# Patient Record
Sex: MALE | Race: WHITE | Employment: FULL TIME | URBAN - METROPOLITAN AREA
[De-identification: names, ages, dates, MRNs, and addresses within clinical notes are randomized per-mention and may not be internally consistent; named-entity substitution may affect disease eponyms.]

---

## 2017-03-10 ENCOUNTER — DOCUMENTATION ONLY (OUTPATIENT)
Dept: OTHER | Facility: CLINIC | Age: 57
End: 2017-03-10

## 2017-03-10 DIAGNOSIS — Z71.89 ACP (ADVANCE CARE PLANNING): Chronic | ICD-10-CM

## 2017-05-22 ENCOUNTER — TRANSFERRED RECORDS (OUTPATIENT)
Dept: HEALTH INFORMATION MANAGEMENT | Facility: CLINIC | Age: 57
End: 2017-05-22

## 2018-01-29 ENCOUNTER — TRANSFERRED RECORDS (OUTPATIENT)
Dept: HEALTH INFORMATION MANAGEMENT | Facility: CLINIC | Age: 58
End: 2018-01-29

## 2018-01-29 LAB
CHOLEST SERPL-MCNC: 182 MG/DL (ref 114–200)
HDLC SERPL-MCNC: 45 MG/DL (ref 40–60)
LDLC SERPL CALC-MCNC: 117 MG/DL
TRIGL SERPL-MCNC: 102 MG/DL (ref 10–200)

## 2019-06-20 ENCOUNTER — TELEPHONE (OUTPATIENT)
Dept: INTERNAL MEDICINE | Facility: OTHER | Age: 59
End: 2019-06-20

## 2019-06-20 NOTE — TELEPHONE ENCOUNTER
Looking to see if his records came through form Dr Helton office pt would like to see Dr Trujillo d/t moved to this side of town no longer in [place]   Pamela M Lechevalier LPN

## 2019-06-24 NOTE — TELEPHONE ENCOUNTER
Received records today.  Tried to call patient to schedule appointment.  Number on file has been disconnected.

## 2019-07-12 NOTE — PROGRESS NOTES
Subjective     Xander Gutiérrez is a 59 year old male who presents to clinic today for the following health issues:    HPI   New Patient/Transfer of Care  Hypertension Follow-up      Do you check your blood pressure regularly outside of the clinic? No     Are you following a low salt diet? Yes    Are your blood pressures ever more than 140 on the top number (systolic) OR more   than 90 on the bottom number (diastolic), for example 140/90? Yes    Xander presents today for establishment of care.  He has a history of HTN.  Patient does not check his blood pressure at home. He does have a wrist cuff at home that he is able to use.   Patient reports past chest pain while working at [employer] after moving to [place]. He describes this as a tight pain across his chest. Patient has switched jobs to [employer] in [place] over 1 year ago. This job has less stress and has not experienced chest pain since transferring jobs. The patient has never had a stress test. Patient has never had chest pain anywhere besides working at [employer]. Patient rides bike and walks a couple times a week with his wife without pain.     Denies current chest pain, shortness of breath, currently leg swelling. HE is due for colonoscopy.  His wife voices some concern about memory loss today and we did discuss the option of neuro psych testing.      Patient has never had colonoscopy. Options were discussed.     Patient Active Problem List   Diagnosis     ACP (advance care planning)     Past Surgical History:   Procedure Laterality Date     HERNIA REPAIR  2004    umbilical hernia        Social History     Tobacco Use     Smoking status: Never Smoker     Smokeless tobacco: Never Used   Substance Use Topics     Alcohol use: Yes     Frequency: Monthly or less     Drinks per session: 1 or 2     Family History   Problem Relation Age of Onset     Unknown/Adopted Mother      Unknown/Adopted Father      Unknown/Adopted Sister          Current Outpatient  "Medications   Medication Sig Dispense Refill     amLODIPine (NORVASC) 10 MG tablet Take 10 mg by mouth       lisinopril (PRINIVIL/ZESTRIL) 10 MG tablet Take 10 mg by mouth       No Known Allergies  BP Readings from Last 3 Encounters:   07/23/19 138/90    Wt Readings from Last 3 Encounters:   07/23/19 99.1 kg (218 lb 8 oz)                    Reviewed and updated as needed this visit by Provider       Review of Systems   ROS COMP: Constitutional, HEENT, cardiovascular, pulmonary, gi and gu systems are negative, except as otherwise noted.      Objective    /84 (BP Location: Left arm, Patient Position: Chair, Cuff Size: Adult Large)   Pulse 71   Temp 97.8  F (36.6  C) (Tympanic)   Resp 16   Ht 1.626 m (5' 4\")   Wt 99.1 kg (218 lb 8 oz)   SpO2 98%   BMI 37.51 kg/m    Body mass index is 37.51 kg/m .  Physical Exam   GENERAL: healthy, alert and no distress  EYES: Eyes grossly normal to inspection, PERRL and conjunctivae and sclerae normal  HENT: ear canals and TM's normal, nose and mouth without ulcers or lesions  NECK: no adenopathy, no asymmetry, masses, or scars and thyroid normal to palpation  RESP: lungs clear to auscultation - no rales, rhonchi or wheezes  CV: regular rate and rhythm, normal S1 S2, no S3 or S4, no murmur, click or rub, no peripheral edema and peripheral pulses strong  ABDOMEN: Obese-soft, nontender, no hepatosplenomegaly, no masses and bowel sounds normal  MS: no gross musculoskeletal defects noted, no edema  SKIN: no suspicious lesions or rashes  NEURO: No focal deficits noted   PSYCH: mentation appears normal, affect normal/bright    Diagnostic Test Results:  No results found for this or any previous visit (from the past 24 hour(s)).  No results found for this or any previous visit.      EKG: Sinus bradycardia        Assessment & Plan   Problem List Items Addressed This Visit     None      Visit Diagnoses     Other chest pain    -  Primary    Relevant Orders    EKG 12-lead complete " w/read - (Clinic Performed)    Screening for prostate cancer        Relevant Orders    PSA, screen    Screening for diabetes mellitus        Relevant Orders    Hemoglobin A1c    Essential hypertension        Relevant Medications    lisinopril (PRINIVIL/ZESTRIL) 10 MG tablet    Other Relevant Orders    Comprehensive metabolic panel    CBC with platelets and differential    Screening for hyperlipidemia        Relevant Orders    Lipid Profile (Chol, Trig, HDL, LDL calc)    Screening for hypothyroidism        Relevant Orders    TSH with free T4 reflex         Cologaurd, Shingles and Neuropsych testing TBD    Follow up in 1 week for fasting labs and BP check      Nathen Leon, [facility] [place] [facility]

## 2019-07-23 ENCOUNTER — OFFICE VISIT (OUTPATIENT)
Dept: INTERNAL MEDICINE | Facility: OTHER | Age: 59
End: 2019-07-23
Attending: INTERNAL MEDICINE
Payer: COMMERCIAL

## 2019-07-23 VITALS
HEIGHT: 64 IN | TEMPERATURE: 97.8 F | DIASTOLIC BLOOD PRESSURE: 90 MMHG | OXYGEN SATURATION: 98 % | SYSTOLIC BLOOD PRESSURE: 138 MMHG | HEART RATE: 71 BPM | RESPIRATION RATE: 16 BRPM | BODY MASS INDEX: 37.3 KG/M2 | WEIGHT: 218.5 LBS

## 2019-07-23 DIAGNOSIS — Z12.5 SCREENING FOR PROSTATE CANCER: ICD-10-CM

## 2019-07-23 DIAGNOSIS — Z13.29 SCREENING FOR HYPOTHYROIDISM: ICD-10-CM

## 2019-07-23 DIAGNOSIS — I10 ESSENTIAL HYPERTENSION: ICD-10-CM

## 2019-07-23 DIAGNOSIS — Z13.1 SCREENING FOR DIABETES MELLITUS: ICD-10-CM

## 2019-07-23 DIAGNOSIS — R07.89 OTHER CHEST PAIN: Primary | ICD-10-CM

## 2019-07-23 DIAGNOSIS — Z13.220 SCREENING FOR HYPERLIPIDEMIA: ICD-10-CM

## 2019-07-23 PROCEDURE — G0463 HOSPITAL OUTPT CLINIC VISIT: HCPCS

## 2019-07-23 PROCEDURE — 93005 ELECTROCARDIOGRAM TRACING: CPT

## 2019-07-23 PROCEDURE — 99204 OFFICE O/P NEW MOD 45 MIN: CPT | Mod: 25 | Performed by: INTERNAL MEDICINE

## 2019-07-23 PROCEDURE — 93010 ELECTROCARDIOGRAM REPORT: CPT | Performed by: INTERNAL MEDICINE

## 2019-07-23 RX ORDER — LISINOPRIL 10 MG/1
10 TABLET ORAL
COMMUNITY
Start: 2019-06-09 | End: 2019-09-06

## 2019-07-23 RX ORDER — AMLODIPINE BESYLATE 10 MG/1
10 TABLET ORAL
COMMUNITY
Start: 2019-06-09 | End: 2019-09-06

## 2019-07-23 SDOH — HEALTH STABILITY: MENTAL HEALTH: HOW OFTEN DO YOU HAVE A DRINK CONTAINING ALCOHOL?: MONTHLY OR LESS

## 2019-07-23 SDOH — HEALTH STABILITY: MENTAL HEALTH: HOW MANY STANDARD DRINKS CONTAINING ALCOHOL DO YOU HAVE ON A TYPICAL DAY?: 1 OR 2

## 2019-07-23 ASSESSMENT — ANXIETY QUESTIONNAIRES
GAD7 TOTAL SCORE: 1
1. FEELING NERVOUS, ANXIOUS, OR ON EDGE: SEVERAL DAYS
3. WORRYING TOO MUCH ABOUT DIFFERENT THINGS: NOT AT ALL
6. BECOMING EASILY ANNOYED OR IRRITABLE: NOT AT ALL
4. TROUBLE RELAXING: NOT AT ALL
IF YOU CHECKED OFF ANY PROBLEMS ON THIS QUESTIONNAIRE, HOW DIFFICULT HAVE THESE PROBLEMS MADE IT FOR YOU TO DO YOUR WORK, TAKE CARE OF THINGS AT HOME, OR GET ALONG WITH OTHER PEOPLE: NOT DIFFICULT AT ALL
5. BEING SO RESTLESS THAT IT IS HARD TO SIT STILL: NOT AT ALL
2. NOT BEING ABLE TO STOP OR CONTROL WORRYING: NOT AT ALL
7. FEELING AFRAID AS IF SOMETHING AWFUL MIGHT HAPPEN: NOT AT ALL

## 2019-07-23 ASSESSMENT — PAIN SCALES - GENERAL: PAINLEVEL: NO PAIN (0)

## 2019-07-23 ASSESSMENT — MIFFLIN-ST. JEOR: SCORE: 1722.11

## 2019-07-23 ASSESSMENT — PATIENT HEALTH QUESTIONNAIRE - PHQ9: SUM OF ALL RESPONSES TO PHQ QUESTIONS 1-9: 5

## 2019-07-23 NOTE — NURSING NOTE
"Chief Complaint   Patient presents with     Establish Care       Initial /84 (BP Location: Left arm, Patient Position: Chair, Cuff Size: Adult Large)   Pulse 71   Temp 97.8  F (36.6  C) (Tympanic)   Resp 16   Ht 1.626 m (5' 4\")   Wt 99.1 kg (218 lb 8 oz)   SpO2 98%   BMI 37.51 kg/m   Estimated body mass index is 37.51 kg/m  as calculated from the following:    Height as of this encounter: 1.626 m (5' 4\").    Weight as of this encounter: 99.1 kg (218 lb 8 oz).  Medication Reconciliation: complete   Pamela M Lechevalier LPN      "

## 2019-07-23 NOTE — PATIENT INSTRUCTIONS
Cologaurd, Shingles and Neuropsych testing TBD    Follow up in 1 week for fasting labs and BP check

## 2019-07-24 ASSESSMENT — ANXIETY QUESTIONNAIRES: GAD7 TOTAL SCORE: 1

## 2019-07-31 DIAGNOSIS — Z13.220 SCREENING FOR HYPERLIPIDEMIA: ICD-10-CM

## 2019-07-31 DIAGNOSIS — Z12.5 SCREENING FOR PROSTATE CANCER: ICD-10-CM

## 2019-07-31 DIAGNOSIS — Z13.1 SCREENING FOR DIABETES MELLITUS: ICD-10-CM

## 2019-07-31 DIAGNOSIS — I10 ESSENTIAL HYPERTENSION: ICD-10-CM

## 2019-07-31 DIAGNOSIS — Z13.29 SCREENING FOR HYPOTHYROIDISM: ICD-10-CM

## 2019-07-31 LAB
ALBUMIN SERPL-MCNC: 4 G/DL (ref 3.4–5)
ALP SERPL-CCNC: 73 U/L (ref 40–150)
ALT SERPL W P-5'-P-CCNC: 37 U/L (ref 0–70)
ANION GAP SERPL CALCULATED.3IONS-SCNC: 7 MMOL/L (ref 3–14)
AST SERPL W P-5'-P-CCNC: 25 U/L (ref 0–45)
BASOPHILS # BLD AUTO: 0 10E9/L (ref 0–0.2)
BASOPHILS NFR BLD AUTO: 0.5 %
BILIRUB SERPL-MCNC: 0.6 MG/DL (ref 0.2–1.3)
BUN SERPL-MCNC: 22 MG/DL (ref 7–30)
CALCIUM SERPL-MCNC: 8.9 MG/DL (ref 8.5–10.1)
CHLORIDE SERPL-SCNC: 107 MMOL/L (ref 94–109)
CHOLEST SERPL-MCNC: 178 MG/DL
CO2 SERPL-SCNC: 26 MMOL/L (ref 20–32)
CREAT SERPL-MCNC: 1.01 MG/DL (ref 0.66–1.25)
DIFFERENTIAL METHOD BLD: NORMAL
EOSINOPHIL # BLD AUTO: 0.4 10E9/L (ref 0–0.7)
EOSINOPHIL NFR BLD AUTO: 5.9 %
ERYTHROCYTE [DISTWIDTH] IN BLOOD BY AUTOMATED COUNT: 13 % (ref 10–15)
EST. AVERAGE GLUCOSE BLD GHB EST-MCNC: 105 MG/DL
GFR SERPL CREATININE-BSD FRML MDRD: 82 ML/MIN/{1.73_M2}
GLUCOSE SERPL-MCNC: 97 MG/DL (ref 70–99)
HBA1C MFR BLD: 5.3 % (ref 0–5.6)
HCT VFR BLD AUTO: 46.9 % (ref 40–53)
HDLC SERPL-MCNC: 42 MG/DL
HGB BLD-MCNC: 16.6 G/DL (ref 13.3–17.7)
LDLC SERPL CALC-MCNC: 117 MG/DL
LYMPHOCYTES # BLD AUTO: 2.2 10E9/L (ref 0.8–5.3)
LYMPHOCYTES NFR BLD AUTO: 35.8 %
MCH RBC QN AUTO: 30.7 PG (ref 26.5–33)
MCHC RBC AUTO-ENTMCNC: 35.4 G/DL (ref 31.5–36.5)
MCV RBC AUTO: 87 FL (ref 78–100)
MONOCYTES # BLD AUTO: 0.6 10E9/L (ref 0–1.3)
MONOCYTES NFR BLD AUTO: 10.1 %
NEUTROPHILS # BLD AUTO: 2.9 10E9/L (ref 1.6–8.3)
NEUTROPHILS NFR BLD AUTO: 47.7 %
NONHDLC SERPL-MCNC: 136 MG/DL
PLATELET # BLD AUTO: 162 10E9/L (ref 150–450)
POTASSIUM SERPL-SCNC: 4.1 MMOL/L (ref 3.4–5.3)
PROT SERPL-MCNC: 7.2 G/DL (ref 6.8–8.8)
PSA SERPL-ACNC: 1.44 UG/L (ref 0–4)
RBC # BLD AUTO: 5.4 10E12/L (ref 4.4–5.9)
SODIUM SERPL-SCNC: 140 MMOL/L (ref 133–144)
TRIGL SERPL-MCNC: 93 MG/DL
TSH SERPL DL<=0.005 MIU/L-ACNC: 1.93 MU/L (ref 0.4–4)
WBC # BLD AUTO: 6.2 10E9/L (ref 4–11)

## 2019-07-31 PROCEDURE — 36415 COLL VENOUS BLD VENIPUNCTURE: CPT | Mod: ZL | Performed by: INTERNAL MEDICINE

## 2019-07-31 PROCEDURE — 80061 LIPID PANEL: CPT | Mod: ZL | Performed by: INTERNAL MEDICINE

## 2019-07-31 PROCEDURE — 85025 COMPLETE CBC W/AUTO DIFF WBC: CPT | Mod: ZL | Performed by: INTERNAL MEDICINE

## 2019-07-31 PROCEDURE — 40000788 ZZHCL STATISTIC ESTIMATED AVERAGE GLUCOSE: Mod: ZL | Performed by: INTERNAL MEDICINE

## 2019-07-31 PROCEDURE — 80053 COMPREHEN METABOLIC PANEL: CPT | Mod: ZL | Performed by: INTERNAL MEDICINE

## 2019-07-31 PROCEDURE — 84443 ASSAY THYROID STIM HORMONE: CPT | Mod: ZL | Performed by: INTERNAL MEDICINE

## 2019-07-31 PROCEDURE — G0103 PSA SCREENING: HCPCS | Mod: ZL | Performed by: INTERNAL MEDICINE

## 2019-07-31 PROCEDURE — 83036 HEMOGLOBIN GLYCOSYLATED A1C: CPT | Mod: ZL | Performed by: INTERNAL MEDICINE

## 2019-09-06 DIAGNOSIS — I10 HYPERTENSION, UNSPECIFIED TYPE: Primary | ICD-10-CM

## 2019-09-06 RX ORDER — LISINOPRIL 10 MG/1
10 TABLET ORAL DAILY
Qty: 90 TABLET | Refills: 0 | Status: SHIPPED | OUTPATIENT
Start: 2019-09-06 | End: 2019-09-13

## 2019-09-06 RX ORDER — AMLODIPINE BESYLATE 10 MG/1
10 TABLET ORAL DAILY
Qty: 90 TABLET | Refills: 0 | Status: SHIPPED | OUTPATIENT
Start: 2019-09-06 | End: 2019-09-13

## 2019-09-06 NOTE — TELEPHONE ENCOUNTER
Lisinopril  Last office visit: 07/23/19  Last refill: historical.  Medication never filled by provider.  Please review and sign if appropriate.      Amlodipine  Last office visit: 07/23/*19  Last refill: historical.  Medication never filled by provider  Please review and sign if appropriate.     Thank you.    PCP Dr. Soliz

## 2019-09-13 DIAGNOSIS — I10 HYPERTENSION, UNSPECIFIED TYPE: ICD-10-CM

## 2019-09-13 RX ORDER — AMLODIPINE BESYLATE 10 MG/1
10 TABLET ORAL DAILY
Qty: 90 TABLET | Refills: 0 | Status: SHIPPED | OUTPATIENT
Start: 2019-09-13 | End: 2020-09-22

## 2019-09-13 RX ORDER — LISINOPRIL 10 MG/1
10 TABLET ORAL DAILY
Qty: 90 TABLET | Refills: 0 | Status: SHIPPED | OUTPATIENT
Start: 2019-09-13 | End: 2020-04-30

## 2020-04-30 DIAGNOSIS — I10 HYPERTENSION, UNSPECIFIED TYPE: ICD-10-CM

## 2020-04-30 RX ORDER — LISINOPRIL 10 MG/1
TABLET ORAL
Qty: 90 TABLET | Refills: 0 | Status: SHIPPED | OUTPATIENT
Start: 2020-04-30 | End: 2021-01-22

## 2020-04-30 NOTE — TELEPHONE ENCOUNTER
lisinopril (ZESTRIL) 10 MG tablet     ACE Inhibitors (Including Combos) Protocol Failed    Blood pressure under 140/90 in past 12 months    BP Readings from Last 3 Encounters:   07/23/19 138/90

## 2020-04-30 NOTE — TELEPHONE ENCOUNTER
Lisinopril      Last Written Prescription Date:  09/13/2019  Last Fill Quantity: 90,   # refills: 0  Last Office Visit: 07/23/2019

## 2020-07-01 ENCOUNTER — OFFICE VISIT (OUTPATIENT)
Dept: CHIROPRACTIC MEDICINE | Facility: OTHER | Age: 60
End: 2020-07-01
Attending: CHIROPRACTOR
Payer: COMMERCIAL

## 2020-07-01 DIAGNOSIS — M54.50 ACUTE BILATERAL LOW BACK PAIN WITHOUT SCIATICA: ICD-10-CM

## 2020-07-01 DIAGNOSIS — M99.02 SEGMENTAL AND SOMATIC DYSFUNCTION OF THORACIC REGION: ICD-10-CM

## 2020-07-01 DIAGNOSIS — M99.01 SEGMENTAL AND SOMATIC DYSFUNCTION OF CERVICAL REGION: Primary | ICD-10-CM

## 2020-07-01 DIAGNOSIS — M99.03 SEGMENTAL AND SOMATIC DYSFUNCTION OF LUMBAR REGION: ICD-10-CM

## 2020-07-01 PROCEDURE — 98941 CHIROPRACT MANJ 3-4 REGIONS: CPT | Mod: AT | Performed by: CHIROPRACTOR

## 2020-07-02 NOTE — PROGRESS NOTES
Subjective Finding:    Chief compalint: Patient presents with:  Back Pain  Neck Pain  , Pain Scale: 4/10, Intensity: dull and ache, Duration: 1 week, Radiating: no.    Date of injury:       Activities that the pain restricts:   Home/household/hobbies/social activities: yes.  Work duties: no.  Sleep: yes.  Makes symptoms better: rest.  Makes symptoms worse: activity, lumbar extension, lumbar flexion, cervical extension and cervical flexion.  Have you seen anyone else for the symptoms? No.  Work related: no.  Automobile related injury: no    Objective and Assessment:    Posture Analysis:   High shoulder: right.  Head tilt: right.  High iliac crest: right.  Head carriage: forward.  Thoracic Kyphosis: neutral.  Lumbar Lordosis: neutral.    Lumbar Range of Motion: extension decreased.  Cervical Range of Motion: extension decreased, left lateral flexion decreased and right lateral flexion decreased.  Thoracic Range of Motion: extension decreased.  Extremity Range of Motion: .    Palpation:   Quad lumb: bilateral, referred pain: no  T paraspinals: stiff, no  Traps: stiff, referred pain: no    Segmental dysfunction pre-treatment and treatment area: C6  T5-6-7  L3-4.    Assessment post-treatment:  Cervical: ROM increased and pain and tenderness decreased.  Thoracic: ROM increased.  Lumbar: ROM increased and muscle spasm decreased.    Comments:      Complicating Factors: .    Plan / Procedure:    Treatment plan:  PRN  Instructed patient: ice 20 minutes every other hour as needed, stretch as instructed at visit and walk 10 minutes.  Short term goals: reduce pain and increase joint flexibility.  Long term goals: restore normal function.  Prognosis: excellent.

## 2020-09-15 ENCOUNTER — OFFICE VISIT (OUTPATIENT)
Dept: FAMILY MEDICINE | Facility: OTHER | Age: 60
End: 2020-09-15
Attending: NURSE PRACTITIONER
Payer: COMMERCIAL

## 2020-09-15 ENCOUNTER — ANCILLARY PROCEDURE (OUTPATIENT)
Dept: GENERAL RADIOLOGY | Facility: OTHER | Age: 60
End: 2020-09-15
Attending: NURSE PRACTITIONER
Payer: COMMERCIAL

## 2020-09-15 VITALS
SYSTOLIC BLOOD PRESSURE: 162 MMHG | DIASTOLIC BLOOD PRESSURE: 88 MMHG | HEIGHT: 64 IN | BODY MASS INDEX: 37.05 KG/M2 | WEIGHT: 217 LBS | OXYGEN SATURATION: 98 % | TEMPERATURE: 97.2 F | HEART RATE: 66 BPM

## 2020-09-15 DIAGNOSIS — M79.672 LEFT FOOT PAIN: Primary | ICD-10-CM

## 2020-09-15 DIAGNOSIS — M77.32 CALCANEAL SPUR, LEFT: ICD-10-CM

## 2020-09-15 DIAGNOSIS — M79.672 LEFT FOOT PAIN: ICD-10-CM

## 2020-09-15 PROBLEM — I10 ESSENTIAL HYPERTENSION: Status: ACTIVE | Noted: 2020-09-15

## 2020-09-15 PROCEDURE — G0463 HOSPITAL OUTPT CLINIC VISIT: HCPCS

## 2020-09-15 PROCEDURE — 73630 X-RAY EXAM OF FOOT: CPT | Mod: TC,LT,FY

## 2020-09-15 PROCEDURE — 99202 OFFICE O/P NEW SF 15 MIN: CPT | Performed by: NURSE PRACTITIONER

## 2020-09-15 ASSESSMENT — MIFFLIN-ST. JEOR: SCORE: 1710.31

## 2020-09-15 ASSESSMENT — PAIN SCALES - GENERAL: PAINLEVEL: SEVERE PAIN (6)

## 2020-09-15 NOTE — PATIENT INSTRUCTIONS
"    Assessment & Plan     Left foot pain  - XR FOOT LT G/E 3 VW (Clinic Performed); Future  - ORTHOPEDIC ADULT REFERRAL; Future    Calcaneal spur, left  - ORTHOPEDIC ADULT REFERRAL; Future    Ibuprofen PRN  Elevate  Crutches   Ice  ER as needed         BMI:   Estimated body mass index is 37.25 kg/m  as calculated from the following:    Height as of this encounter: 1.626 m (5' 4\").    Weight as of this encounter: 98.4 kg (217 lb).       CDM visit scheduled with Dr Carolyn Lovell, [facility]    "

## 2020-09-15 NOTE — LETTER
[facility]  [address]  [place] [address]  Phone: [phone]    September 15, 2020        Xander Gutiérrez  [address] [place] [id number]          To whom it may concern:    RE: Xander Gutiérrez    Please excuse from work 9/15 and 9/16/2020.    Please contact me for questions or concerns.      Sincerely,        Tawanna Lovell, CNP

## 2020-09-15 NOTE — NURSING NOTE
"Chief Complaint   Patient presents with     Foot Injury       Initial BP (!) 162/88 (BP Location: Right arm, Patient Position: Chair, Cuff Size: Adult Regular)   Pulse 66   Temp 97.2  F (36.2  C) (Tympanic)   Ht 1.626 m (5' 4\")   Wt 98.4 kg (217 lb)   SpO2 98%   BMI 37.25 kg/m   Estimated body mass index is 37.25 kg/m  as calculated from the following:    Height as of this encounter: 1.626 m (5' 4\").    Weight as of this encounter: 98.4 kg (217 lb).  Medication Reconciliation: complete  Caridad Hamlin LPN    "

## 2020-09-15 NOTE — PROGRESS NOTES
Xander Gutiérrez is a 60 year old male who presents to clinic today for the following health issues:      Musculoskeletal problem/pain  Onset/Duration: yesterday  Description  Location: foot - left  Joint Swelling: no  Redness: YES  Pain: YES  Warmth: no  Intensity:  moderate  Progression of Symptoms:  same  Accompanying signs and symptoms:   Fevers: no  Numbness/tingling/weakness: no  History  Trauma to the area: YES  Recent illness:  no  Previous similar problem: no  Previous evaluation:  no  Precipitating or alleviating factors:  Aggravating factors include: sitting, walking, climbing stairs and overuse  Therapies tried and outcome: rest/inactivity and Ibuprofen        Patient Active Problem List   Diagnosis     ACP (advance care planning)     Essential hypertension     Past Surgical History:   Procedure Laterality Date     HERNIA REPAIR  2004    umbilical hernia        Social History     Tobacco Use     Smoking status: Never Smoker     Smokeless tobacco: Never Used   Substance Use Topics     Alcohol use: Yes     Frequency: Monthly or less     Drinks per session: 1 or 2     Family History   Problem Relation Age of Onset     Unknown/Adopted Mother      Unknown/Adopted Father      Unknown/Adopted Sister            Current Outpatient Medications   Medication Sig Dispense Refill     lisinopril (ZESTRIL) 10 MG tablet TAKE 1 TABLET(10 MG) BY MOUTH DAILY 90 tablet 0     amLODIPine (NORVASC) 10 MG tablet Take 1 tablet (10 mg) by mouth daily (Patient not taking: Reported on 9/15/2020) 90 tablet 0     No Known Allergies       Recent Labs   Lab Test 07/31/19  0727 01/29/18   A1C 5.3  --    * 117   HDL 42 45   TRIG 93 102   ALT 37  --    CR 1.01  --    GFRESTIMATED 82  --    GFRESTBLACK >90  --    POTASSIUM 4.1  --    TSH 1.93  --         BP Readings from Last 3 Encounters:   09/15/20 (!) 162/88   07/23/19 138/90    Wt Readings from Last 3 Encounters:   09/15/20 98.4 kg (217 lb)   07/23/19 99.1 kg (218 lb 8 oz)     "          Review of Systems   Constitutional, HEENT, cardiovascular, pulmonary, gi and gu systems are negative, except as otherwise noted.        Objective    BP (!) 162/88 (BP Location: Right arm, Patient Position: Chair, Cuff Size: Adult Regular)   Pulse 66   Temp 97.2  F (36.2  C) (Tympanic)   Ht 1.626 m (5' 4\")   Wt 98.4 kg (217 lb)   SpO2 98%   BMI 37.25 kg/m    Body mass index is 37.25 kg/m .       Xray completed, it appears he has a cracked anterior calcaneal bone spur.    Physical Exam   GENERAL: healthy, alert and no distress  NECK: no adenopathy, no asymmetry, masses, or scars and thyroid normal to palpation  RESP: lungs clear to auscultation - no rales, rhonchi or wheezes  CV: regular rate and rhythm, normal S1 S2, no S3 or S4, no murmur, click or rub, no peripheral edema and peripheral pulses strong  MS: Left foot pain - anterior calcaneous  SKIN: no suspicious lesions or rashes  PSYCH: mentation appears normal, affect normal/bright            Assessment & Plan     Left foot pain  - XR FOOT LT G/E 3 VW (Clinic Performed); Future  - ORTHOPEDIC ADULT REFERRAL; Future    Calcaneal spur, left  - ORTHOPEDIC ADULT REFERRAL; Future    Ibuprofen PRN  Elevate  Crutches   Ice  ER as needed         BMI:   Estimated body mass index is 37.25 kg/m  as calculated from the following:    Height as of this encounter: 1.626 m (5' 4\").    Weight as of this encounter: 98.4 kg (217 lb).       CDM visit scheduled with Dr Carolyn Lovell, [facility]    "

## 2020-09-16 ENCOUNTER — OFFICE VISIT (OUTPATIENT)
Dept: PODIATRY | Facility: OTHER | Age: 60
End: 2020-09-16
Attending: PODIATRIST
Payer: COMMERCIAL

## 2020-09-16 VITALS
WEIGHT: 218 LBS | TEMPERATURE: 97.4 F | HEIGHT: 64 IN | BODY MASS INDEX: 37.22 KG/M2 | SYSTOLIC BLOOD PRESSURE: 150 MMHG | HEART RATE: 75 BPM | OXYGEN SATURATION: 98 % | DIASTOLIC BLOOD PRESSURE: 90 MMHG | RESPIRATION RATE: 15 BRPM

## 2020-09-16 DIAGNOSIS — M79.672 LEFT FOOT PAIN: ICD-10-CM

## 2020-09-16 DIAGNOSIS — S93.692A RUPTURE OF PLANTAR FASCIA OF LEFT FOOT, INITIAL ENCOUNTER: Primary | ICD-10-CM

## 2020-09-16 DIAGNOSIS — E66.01 MORBID OBESITY (H): ICD-10-CM

## 2020-09-16 DIAGNOSIS — M77.32 CALCANEAL SPUR, LEFT: ICD-10-CM

## 2020-09-16 PROCEDURE — 99203 OFFICE O/P NEW LOW 30 MIN: CPT | Performed by: PODIATRIST

## 2020-09-16 PROCEDURE — G0463 HOSPITAL OUTPT CLINIC VISIT: HCPCS

## 2020-09-16 ASSESSMENT — PAIN SCALES - GENERAL: PAINLEVEL: MODERATE PAIN (5)

## 2020-09-16 ASSESSMENT — MIFFLIN-ST. JEOR: SCORE: 1714.84

## 2020-09-16 NOTE — LETTER
September 17, 2020      Xander Gutiérrez  [address] [place] [id number]        To Whom It May Concern:    Xander Gutiérrez was seen in our clinic on 09/16/2020, for a LEFT foot injury.     Restrictions for patient:  Patient may drive or do any sitting activities.  Patient must wear a CAM walker boot at all times. He is instructed to also use crutches if he has pain in the CAM boot.  He may stop putting weight on the foot (with the boot on) if he is having any pain. He may do standing activities with the boot on his foot as long as it is not causing any pain.    He may return to work with the above restrictions. He will follow-up with podiatry on 11/04/2020 to determine if his restrictions can be lifted.      Sincerely,        Samia Rodas DPM

## 2020-09-16 NOTE — PROGRESS NOTES
"Chief complaint: Patient presents with:  Consult: referred by Tawanna Lovell for left foot spur        History of Present Illness: This 60 year old male is seen at the request of Liliya for evaluation and suggestions of management of LEFT heel pain. He was letting his dog outside on Monday, 09/14/2020, and he missed a stair and his heel hit the landing very hard. He instantly had moderate pain to his foot. He took some IBUprofen which decreased his pain. He saw Tawanna Lovell on 09/15/2020 who ordered an x-ray.     He has been using crutches because of the pain.     He works at [email or web address]. He delivers parts and shuttles people. 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    No further pedal complaints today.     Patient does not use tobacco products.         BP (!) 150/90 (BP Location: Left arm, Cuff Size: Adult Regular)   Pulse 75   Temp 97.4  F (36.3  C) (Tympanic)   Resp 15   Ht 1.626 m (5' 4\")   Wt 98.9 kg (218 lb)   SpO2 98%   BMI 37.42 kg/m      Patient Active Problem List   Diagnosis     ACP (advance care planning)     Essential hypertension       Past Surgical History:   Procedure Laterality Date     HERNIA REPAIR  2004    umbilical hernia        Current Outpatient Medications   Medication     amLODIPine (NORVASC) 10 MG tablet     lisinopril (ZESTRIL) 10 MG tablet     No current facility-administered medications for this visit.         No Known Allergies    Family History   Problem Relation Age of Onset     Unknown/Adopted Mother      Unknown/Adopted Father      Unknown/Adopted Sister        Social History     Socioeconomic History     Marital status:      Spouse name: None     Number of children: None     Years of education: None     Highest education level: None   Occupational History     None   Social Needs     Financial resource strain: None     Food insecurity     Worry: None     Inability: None     Transportation needs     Medical: None     Non-medical: None   Tobacco Use     Smoking status: Never Smoker     " Smokeless tobacco: Never Used   Substance and Sexual Activity     Alcohol use: Yes     Frequency: Monthly or less     Drinks per session: 1 or 2     Drug use: Not Currently     Sexual activity: Yes     Partners: Female   Lifestyle     Physical activity     Days per week: None     Minutes per session: None     Stress: None   Relationships     Social connections     Talks on phone: None     Gets together: None     Attends [employer] service: None     Active member of club or organization: None     Attends meetings of clubs or organizations: None     Relationship status: None     Intimate partner violence     Fear of current or ex partner: None     Emotionally abused: None     Physically abused: None     Forced sexual activity: None   Other Topics Concern     None   Social History Narrative     None       ROS: 10 point ROS neg other than the symptoms noted above in the HPI.  EXAM  Constitutional: healthy, alert and no distress    Psychiatric: mentation appears normal and affect normal/bright    VASCULAR:  -Dorsalis pedis pulse +2/4 b/l  -Posterior tibial pulse +2/4 b/l  -Capillary refill time < 3 seconds to b/l hallux  -Hair growth Present to b/l anterior legs and ankles  NEURO:  -Light touch sensation intact to b/l plantar forefoot  DERM:  -Skin temperature, texture and turgor WNL b/l  -Toenails elongated, thickened, dystrophic and discolored x 10  MSK:  -Moderate pain on palpation to LEFT medial tubercle and LEFT plantar central heel  -Mild tenderness to plantar central heel  -No pain to lateral calcaneus  -Muscle strength of ankles +5/5 for dorsiflexion, plantarflexion, ABDUction and ADDuction b/l    -Ankle joint passive ROM within normal limits except for dorsiflexion:    Dorsiflexion, RIGHT Straight knee 0 degrees    Dorsiflexion, LEFT Straight knee 0 degrees    LEFT HEEL RADIOGRAPH 09/16/2020  FINDINGS:  There are bony spurs at the insertion of the Achilles tendon and plantar fascia to the calcaneus. Mild  degenerative changes are seen at the navicular cuneiform articulation. No acute fractures or destructive lesions are noted.                                 IMPRESSION: Calcaneal spurs    MARLENY WHITE MD  ============================================================    ASSESSMENT:  (O48.339O) Rupture of plantar fascia of left foot, initial encounter  (primary encounter diagnosis)    (M79.672) Left foot pain    (M77.32) Calcaneal spur, left    (E66.01) Morbid obesity (H)      PLAN:  -Patient evaluated and examined. Treatment options discussed with no educational barriers noted.  -Reviewed radiographs with patient -- he has a small avulsion fracture of the plantar calcaneal spur. He likely had a partial rupture of some of the soft tissues in  This area, particularly the plantar fascia since he has the most pain at the plantar fascia insertion site.  -Patient's plantar fascia appears intact but the heel spur fragment and the location of pain after his description of trauma suggests a likely partial rupture of the plantar fascia. Discussed the etiology of how this likely occurred with his trauma. He may heal well with conservative treatment options, so these will be exhausted first.    -Compression socks: Advised to wear compression socks all day (especially when working) to decrease LOWER EXTREMITY swelling in both feet and legs. Apply the socks first thing in the morning before getting out of bed and remove them at night when the feet are elevated in bed.  -Stability Shoe Gear: This involves wearing a solid tennis shoe that bends at the toe, but has a solid midfoot shank and heel contour.   -Icing: Ice the bottom of the foot minimally once a day for ten minutes per foot with a frozen water bottle. Ice after any extended amount of time on your feet.    -Dispensed a long leg CAM boot to patient (size medium) in clinic.  ---Patient to wear this with crutches. As tolerated, he may slowly transition to one crutch then no  crutches. After a couple weeks, if tolerated, he may slowly transition to a tennis shoe. If this is too uncomfortable, however, he may require the boot for up to six weeks.  ----Patient advised to purchase a soft gel heel cup to go with the boot to decrease pressure on the heel / plantar fascia insertion site.    -Patient in agreement with the above treatment plan and all of patient's questions were answered.      RTC six weeks to evaluate LEFT plantar foot pain post injury        Samia Rodas DPM

## 2020-09-16 NOTE — NURSING NOTE
"Chief Complaint   Patient presents with     Consult     referred by Tawanna Lovell for left foot spur       Initial BP (!) 150/90 (BP Location: Left arm, Cuff Size: Adult Regular)   Pulse 75   Temp 97.4  F (36.3  C) (Tympanic)   Resp 15   Ht 1.626 m (5' 4\")   Wt 98.9 kg (218 lb)   SpO2 98%   BMI 37.42 kg/m   Estimated body mass index is 37.42 kg/m  as calculated from the following:    Height as of this encounter: 1.626 m (5' 4\").    Weight as of this encounter: 98.9 kg (218 lb).  Medication Reconciliation: complete  Janey Gold LPN  "

## 2020-09-16 NOTE — PATIENT INSTRUCTIONS
-Compression socks: Advised to wear compression socks all day (especially when working) to decrease LOWER EXTREMITY swelling in both feet and legs. Apply the socks first thing in the morning before getting out of bed and remove them at night when the feet are elevated in bed.  -Stability Shoe Gear: This involves wearing a solid tennis shoe that bends at the toe, but has a solid midfoot shank and heel contour.   -Icing: Ice the bottom of the foot minimally once a day for ten minutes per foot with a frozen water bottle. Ice after any extended amount of time on your feet.      -Add a gel heel cup to the LEFT boot. Use crutches until no longer tolerated

## 2020-09-21 NOTE — PROGRESS NOTES
Subjective     Xander Gutiérrez is a 60 year old male who presents to clinic today for the following health issues:    HPI       Hypertension Follow-up      Do you check your blood pressure regularly outside of the clinic? No     Are you following a low salt diet? Yes    Are your blood pressures ever more than 140 on the top number (systolic) OR more   than 90 on the bottom number (diastolic), for example 140/90? Yes      Xander Gutiérrez presents today for follow up of his HTN.  He denies any chest pain or SOB.  He was previously on Amlodipine with Lisinopril but now just on Lisinopril.  He would prefer to avoid doing labs today.  He returns to clinic on November 4th for his left ankle follow up so he will get a repeat BP check then and do labs.          Review of Systems   Constitutional, HEENT, cardiovascular, pulmonary, gi and gu systems are negative, except as otherwise noted.      Objective    BP (!) 148/82 (BP Location: Left arm, Patient Position: Chair, Cuff Size: Adult Large)   Pulse 71   Temp 96.6  F (35.9  C) (Tympanic)   Wt 98.9 kg (218 lb)   SpO2 99%   BMI 37.42 kg/m    Body mass index is 37.42 kg/m .  Physical Exam       No results found for this or any previous visit (from the past 24 hour(s)).  No results found for any visits on 09/22/20.  Orders Only on 07/31/2019   Component Date Value Ref Range Status     Sodium 07/31/2019 140  133 - 144 mmol/L Final     Potassium 07/31/2019 4.1  3.4 - 5.3 mmol/L Final     Chloride 07/31/2019 107  94 - 109 mmol/L Final     Carbon Dioxide 07/31/2019 26  20 - 32 mmol/L Final     Anion Gap 07/31/2019 7  3 - 14 mmol/L Final     Glucose 07/31/2019 97  70 - 99 mg/dL Final    Fasting specimen     Urea Nitrogen 07/31/2019 22  7 - 30 mg/dL Final     Creatinine 07/31/2019 1.01  0.66 - 1.25 mg/dL Final     GFR Estimate 07/31/2019 82  >60 mL/min/[1.73_m2] Final    Comment: Non  GFR Calc  Starting 12/18/2018, serum creatinine based estimated GFR (eGFR) will be    calculated using the Chronic Kidney Disease Epidemiology Collaboration   (CKD-EPI) equation.       GFR Estimate If Black 07/31/2019 >90  >60 mL/min/[1.73_m2] Final    Comment:  GFR Calc  Starting 12/18/2018, serum creatinine based estimated GFR (eGFR) will be   calculated using the Chronic Kidney Disease Epidemiology Collaboration   (CKD-EPI) equation.       Calcium 07/31/2019 8.9  8.5 - 10.1 mg/dL Final     Bilirubin Total 07/31/2019 0.6  0.2 - 1.3 mg/dL Final     Albumin 07/31/2019 4.0  3.4 - 5.0 g/dL Final     Protein Total 07/31/2019 7.2  6.8 - 8.8 g/dL Final     Alkaline Phosphatase 07/31/2019 73  40 - 150 U/L Final     ALT 07/31/2019 37  0 - 70 U/L Final     AST 07/31/2019 25  0 - 45 U/L Final     TSH 07/31/2019 1.93  0.40 - 4.00 mU/L Final     Cholesterol 07/31/2019 178  <200 mg/dL Final     Triglycerides 07/31/2019 93  <150 mg/dL Final    Fasting specimen     HDL Cholesterol 07/31/2019 42  >39 mg/dL Final     LDL Cholesterol Calculated 07/31/2019 117* <100 mg/dL Final    Comment: Above desirable:  100-129 mg/dl  Borderline High:  130-159 mg/dL  High:             160-189 mg/dL  Very high:       >189 mg/dl       Non HDL Cholesterol 07/31/2019 136* <130 mg/dL Final    Comment: Above Desirable:  130-159 mg/dl  Borderline high:  160-189 mg/dl  High:             190-219 mg/dl  Very high:       >219 mg/dl       PSA 07/31/2019 1.44  0 - 4 ug/L Final    Assay Method:  Chemiluminescence using Siemens Vista analyzer     Hemoglobin A1C 07/31/2019 5.3  0 - 5.6 % Final    Comment: Normal <5.7% Prediabetes 5.7-6.4%  Diabetes 6.5% or higher - adopted from ADA   consensus guidelines.       WBC 07/31/2019 6.2  4.0 - 11.0 10e9/L Final     RBC Count 07/31/2019 5.40  4.4 - 5.9 10e12/L Final     Hemoglobin 07/31/2019 16.6  13.3 - 17.7 g/dL Final     Hematocrit 07/31/2019 46.9  40.0 - 53.0 % Final     MCV 07/31/2019 87  78 - 100 fl Final     MCH 07/31/2019 30.7  26.5 - 33.0 pg Final     MCHC 07/31/2019 35.4  31.5  - 36.5 g/dL Final     RDW 07/31/2019 13.0  10.0 - 15.0 % Final     Platelet Count 07/31/2019 162  150 - 450 10e9/L Final     % Neutrophils 07/31/2019 47.7  % Final     % Lymphocytes 07/31/2019 35.8  % Final     % Monocytes 07/31/2019 10.1  % Final     % Eosinophils 07/31/2019 5.9  % Final     % Basophils 07/31/2019 0.5  % Final     Absolute Neutrophil 07/31/2019 2.9  1.6 - 8.3 10e9/L Final     Absolute Lymphocytes 07/31/2019 2.2  0.8 - 5.3 10e9/L Final     Absolute Monocytes 07/31/2019 0.6  0.0 - 1.3 10e9/L Final     Absolute Eosinophils 07/31/2019 0.4  0.0 - 0.7 10e9/L Final     Absolute Basophils 07/31/2019 0.0  0.0 - 0.2 10e9/L Final     Diff Method 07/31/2019 Automated Method   Final     Estimated Average Glucose 07/31/2019 105  mg/dL Final           Assessment & Plan   Problem List Items Addressed This Visit     None      Visit Diagnoses     Screening for prostate cancer    -  Primary    Relevant Orders    PSA, screen    Hypertension, unspecified type        Relevant Orders    Basic metabolic panel-future    CBC with platelets and differential-future               Nathen Leon DO  [facility]

## 2020-09-22 ENCOUNTER — OFFICE VISIT (OUTPATIENT)
Dept: INTERNAL MEDICINE | Facility: OTHER | Age: 60
End: 2020-09-22
Attending: INTERNAL MEDICINE
Payer: COMMERCIAL

## 2020-09-22 VITALS
DIASTOLIC BLOOD PRESSURE: 82 MMHG | BODY MASS INDEX: 37.42 KG/M2 | SYSTOLIC BLOOD PRESSURE: 148 MMHG | HEART RATE: 71 BPM | TEMPERATURE: 96.6 F | OXYGEN SATURATION: 99 % | WEIGHT: 218 LBS

## 2020-09-22 DIAGNOSIS — I10 HYPERTENSION, UNSPECIFIED TYPE: ICD-10-CM

## 2020-09-22 DIAGNOSIS — Z12.5 SCREENING FOR PROSTATE CANCER: Primary | ICD-10-CM

## 2020-09-22 PROCEDURE — G0463 HOSPITAL OUTPT CLINIC VISIT: HCPCS

## 2020-09-22 PROCEDURE — 99213 OFFICE O/P EST LOW 20 MIN: CPT | Performed by: INTERNAL MEDICINE

## 2020-09-22 ASSESSMENT — PAIN SCALES - GENERAL: PAINLEVEL: MILD PAIN (3)

## 2020-09-22 NOTE — NURSING NOTE
"Chief Complaint   Patient presents with     Hypertension       Initial BP (!) 148/82 (BP Location: Left arm, Patient Position: Chair, Cuff Size: Adult Large)   Pulse 71   Temp 96.6  F (35.9  C) (Tympanic)   Wt 98.9 kg (218 lb)   SpO2 99%   BMI 37.42 kg/m   Estimated body mass index is 37.42 kg/m  as calculated from the following:    Height as of 9/16/20: 1.626 m (5' 4\").    Weight as of this encounter: 98.9 kg (218 lb).  Medication Reconciliation: complete  ADRIANNE MENDIOLA LPN    "

## 2020-11-04 ENCOUNTER — OFFICE VISIT (OUTPATIENT)
Dept: PODIATRY | Facility: OTHER | Age: 60
End: 2020-11-04
Attending: PODIATRIST
Payer: COMMERCIAL

## 2020-11-04 VITALS
HEART RATE: 72 BPM | BODY MASS INDEX: 38.28 KG/M2 | TEMPERATURE: 97.7 F | WEIGHT: 223 LBS | DIASTOLIC BLOOD PRESSURE: 80 MMHG | SYSTOLIC BLOOD PRESSURE: 120 MMHG | OXYGEN SATURATION: 98 %

## 2020-11-04 DIAGNOSIS — M77.32 CALCANEAL SPUR, LEFT: ICD-10-CM

## 2020-11-04 DIAGNOSIS — E66.01 MORBID OBESITY (H): ICD-10-CM

## 2020-11-04 DIAGNOSIS — S93.692A RUPTURE OF PLANTAR FASCIA OF LEFT FOOT, INITIAL ENCOUNTER: Primary | ICD-10-CM

## 2020-11-04 LAB
BASOPHILS # BLD AUTO: 0.1 10E9/L (ref 0–0.2)
BASOPHILS NFR BLD AUTO: 0.7 %
DIFFERENTIAL METHOD BLD: NORMAL
EOSINOPHIL # BLD AUTO: 0.3 10E9/L (ref 0–0.7)
EOSINOPHIL NFR BLD AUTO: 4.3 %
ERYTHROCYTE [DISTWIDTH] IN BLOOD BY AUTOMATED COUNT: 13 % (ref 10–15)
HCT VFR BLD AUTO: 47.2 % (ref 40–53)
HGB BLD-MCNC: 16.3 G/DL (ref 13.3–17.7)
LYMPHOCYTES # BLD AUTO: 2.4 10E9/L (ref 0.8–5.3)
LYMPHOCYTES NFR BLD AUTO: 33.6 %
MCH RBC QN AUTO: 29.5 PG (ref 26.5–33)
MCHC RBC AUTO-ENTMCNC: 34.5 G/DL (ref 31.5–36.5)
MCV RBC AUTO: 85 FL (ref 78–100)
MONOCYTES # BLD AUTO: 0.6 10E9/L (ref 0–1.3)
MONOCYTES NFR BLD AUTO: 8.2 %
NEUTROPHILS # BLD AUTO: 3.8 10E9/L (ref 1.6–8.3)
NEUTROPHILS NFR BLD AUTO: 53.2 %
PLATELET # BLD AUTO: 167 10E9/L (ref 150–450)
RBC # BLD AUTO: 5.53 10E12/L (ref 4.4–5.9)
WBC # BLD AUTO: 7.2 10E9/L (ref 4–11)

## 2020-11-04 PROCEDURE — G0103 PSA SCREENING: HCPCS | Mod: ZL | Performed by: INTERNAL MEDICINE

## 2020-11-04 PROCEDURE — 85025 COMPLETE CBC W/AUTO DIFF WBC: CPT | Mod: ZL | Performed by: INTERNAL MEDICINE

## 2020-11-04 PROCEDURE — 84153 ASSAY OF PSA TOTAL: CPT | Performed by: INTERNAL MEDICINE

## 2020-11-04 PROCEDURE — 80048 BASIC METABOLIC PNL TOTAL CA: CPT | Mod: ZL | Performed by: INTERNAL MEDICINE

## 2020-11-04 PROCEDURE — 36415 COLL VENOUS BLD VENIPUNCTURE: CPT | Mod: ZL | Performed by: INTERNAL MEDICINE

## 2020-11-04 PROCEDURE — 99213 OFFICE O/P EST LOW 20 MIN: CPT | Performed by: PODIATRIST

## 2020-11-04 PROCEDURE — G0463 HOSPITAL OUTPT CLINIC VISIT: HCPCS

## 2020-11-04 ASSESSMENT — PAIN SCALES - GENERAL: PAINLEVEL: NO PAIN (0)

## 2020-11-04 NOTE — NURSING NOTE
"Chief Complaint   Patient presents with     RECHECK     Plantar Fascitis  Left Heel       Initial /80   Pulse 72   Temp 97.7  F (36.5  C) (Tympanic)   Wt 101.2 kg (223 lb)   SpO2 98%   BMI 38.28 kg/m   Estimated body mass index is 38.28 kg/m  as calculated from the following:    Height as of 9/16/20: 1.626 m (5' 4\").    Weight as of this encounter: 101.2 kg (223 lb).  Medication Reconciliation: complete  Giuliana Blancas LPN  "

## 2020-11-04 NOTE — PROGRESS NOTES
Chief complaint: Patient presents with:  RECHECK: Plantar Fascitis  Left Heel      History of Present Illness: This 60 year old male is seen for follow-up management of LEFT heel pain.     He was wearing a CAM boot full time, but the past few weeks, he has been wearing his CAM boot while using crutches when walking outside and he has been using a house slipper at home. He has minimal pain when walking at home without the boot.    He works at [employer]. 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    Patient says he was called to schedule an appointment for orthotics, but he did not schedule it because he says he already has orthotics and they are working well in his shoes.    No further pedal complaints today.     Patient does not use tobacco products.       History of LEFT heel injury:  Patient was letting his dog outside on Monday, 09/14/2020, and he missed a stair and his heel hit the landing very hard. He instantly had moderate pain to his foot. He took some IBUprofen which decreased his pain. He saw Tawanna Lovell on 09/15/2020 who ordered an x-ray which showed a small bone spur fracture on the plantar surface of the heel bone.        /80   Pulse 72   Temp 97.7  F (36.5  C) (Tympanic)   Wt 101.2 kg (223 lb)   SpO2 98%   BMI 38.28 kg/m      Patient Active Problem List   Diagnosis     ACP (advance care planning)     Essential hypertension     Morbid obesity (H)       Past Surgical History:   Procedure Laterality Date     HERNIA REPAIR  2004    umbilical hernia        Current Outpatient Medications   Medication     lisinopril (ZESTRIL) 10 MG tablet     No current facility-administered medications for this visit.         No Known Allergies    Family History   Problem Relation Age of Onset     Unknown/Adopted Mother      Unknown/Adopted Father      Unknown/Adopted Sister        Social History     Socioeconomic History      Marital status:      Spouse name: None     Number of children: None     Years of education: None     Highest education level: None   Occupational History     None   Social Needs     Financial resource strain: None     Food insecurity     Worry: None     Inability: None     Transportation needs     Medical: None     Non-medical: None   Tobacco Use     Smoking status: Never Smoker     Smokeless tobacco: Never Used   Substance and Sexual Activity     Alcohol use: Yes     Frequency: Monthly or less     Drinks per session: 1 or 2     Drug use: Not Currently     Sexual activity: Yes     Partners: Female   Lifestyle     Physical activity     Days per week: None     Minutes per session: None     Stress: None   Relationships     Social connections     Talks on phone: None     Gets together: None     Attends [employer] service: None     Active member of club or organization: None     Attends meetings of clubs or organizations: None     Relationship status: None     Intimate partner violence     Fear of current or ex partner: None     Emotionally abused: None     Physically abused: None     Forced sexual activity: None   Other Topics Concern     None   Social History Narrative     None       ROS: 10 point ROS neg other than the symptoms noted above in the HPI.  EXAM  Constitutional: healthy, alert and no distress    Psychiatric: mentation appears normal and affect normal/bright    LEFT FOOT FOCUSED    VASCULAR:  -Dorsalis pedis pulse +2/4  -Posterior tibial pulse +2/4  -Capillary refill time < 3 seconds to hallux  -Hair growth Present to anterior leg and ankle  NEURO:  -Light touch sensation intact to plantar forefoot  DERM:  -Skin temperature, texture and turgor WNL b/l  -Toenails elongated, thickened, dystrophic and discolored x 10  MSK:  -No remaining pain on palpation to LEFT medial tubercle and LEFT plantar central heel  -No remaining tenderness to plantar central heel  -No remaining pain to lateral  calcaneus  -Muscle strength of ankles +5/5 for dorsiflexion, plantarflexion, ABDUction and ADDuction b/l    -Ankle joint passive ROM within normal limits except for dorsiflexion:    Dorsiflexion, RIGHT Straight knee 0 degrees    Dorsiflexion, LEFT Straight knee 0 degrees    LEFT HEEL RADIOGRAPH 09/16/2020  FINDINGS:  There are bony spurs at the insertion of the Achilles tendon and plantar fascia to the calcaneus. Mild degenerative changes are seen at the navicular cuneiform articulation. No acute fractures or destructive lesions are noted.                                 IMPRESSION: Calcaneal spurs    MARLENY WHITE MD  ============================================================    ASSESSMENT:  (D67.004B) Rupture of plantar fascia of left foot, initial encounter  (primary encounter diagnosis)    (M77.32) Calcaneal spur, left    (E66.01) Morbid obesity (H)    Body mass index is 38.28 kg/m .       PLAN:  -Patient evaluated and examined. Treatment options discussed with no educational barriers noted.  -Patient asked if he will get a follow-up radiograph today.  He has zero pain and avulsion fractures do no always heal, so a follow-up radiograph is not needed at this time.  -Patient has been walking without a boot while at home. He is advised to start transitioning out of the CAM boot full time and he may resume working on Monday, 11/09/2020. Work note dispensed for patient to return to work without restrictions on 11/09/2020.    -Continue / Start compression socks while at work and in the steel toed boots at work  -Continue stability shoe gear at all times when not in work boots. He may continue with slipper at home as tolerated.    -Continue inserts in shoes    -Continue icing the bottom of the foot minimally once a day for ten minutes per foot with a frozen water bottle. Especially ice after a shift at work.    -Patient in agreement with the above treatment plan and all of patient's questions were  answered.      RTC as needed      Samia Rodas DPM

## 2020-11-04 NOTE — LETTER
November 4, 2020      Xander Gutiérrez  [address] [place] [id number]        To Whom It May Concern:    Xander Gutiérrez was seen in our clinic on 11/04/2020. He may return to work on Monday, 11/09/2020 without restrictions to his LEFT foot.      Sincerely,        Samia Rodas DPM

## 2020-11-05 LAB
ANION GAP SERPL CALCULATED.3IONS-SCNC: 8 MMOL/L (ref 3–14)
BUN SERPL-MCNC: 19 MG/DL (ref 7–30)
CALCIUM SERPL-MCNC: 8.6 MG/DL (ref 8.5–10.1)
CHLORIDE SERPL-SCNC: 105 MMOL/L (ref 94–109)
CO2 SERPL-SCNC: 26 MMOL/L (ref 20–32)
CREAT SERPL-MCNC: 1.1 MG/DL (ref 0.66–1.25)
GFR SERPL CREATININE-BSD FRML MDRD: 73 ML/MIN/{1.73_M2}
GLUCOSE SERPL-MCNC: 108 MG/DL (ref 70–99)
POTASSIUM SERPL-SCNC: 4 MMOL/L (ref 3.4–5.3)
PSA SERPL-ACNC: 1.95 UG/L (ref 0–4)
SODIUM SERPL-SCNC: 139 MMOL/L (ref 133–144)

## 2021-01-21 DIAGNOSIS — I10 HYPERTENSION, UNSPECIFIED TYPE: ICD-10-CM

## 2021-01-22 RX ORDER — LISINOPRIL 10 MG/1
TABLET ORAL
Qty: 90 TABLET | Refills: 0 | Status: SHIPPED | OUTPATIENT
Start: 2021-01-22 | End: 2021-05-06

## 2021-01-22 NOTE — TELEPHONE ENCOUNTER
lisinopril (ZESTRIL) 10 MG tablet     Last Written Prescription Date:  4/30/20  Last Fill Quantity: 90,   # refills: 0  Last Office Visit: 9/15/20  Future Office visit:       Routing refill request to provider for review/approval

## 2021-05-06 DIAGNOSIS — I10 HYPERTENSION, UNSPECIFIED TYPE: ICD-10-CM

## 2021-05-06 RX ORDER — LISINOPRIL 10 MG/1
TABLET ORAL
Qty: 90 TABLET | Refills: 0 | Status: SHIPPED | OUTPATIENT
Start: 2021-05-06 | End: 2021-10-19

## 2021-10-17 DIAGNOSIS — I10 HYPERTENSION, UNSPECIFIED TYPE: ICD-10-CM

## 2021-10-19 RX ORDER — LISINOPRIL 10 MG/1
TABLET ORAL
Qty: 90 TABLET | Refills: 0 | Status: SHIPPED | OUTPATIENT
Start: 2021-10-19 | End: 2022-07-21

## 2021-10-19 NOTE — TELEPHONE ENCOUNTER
Lisinopril      Last Written Prescription Date:  5/6/21  Last Fill Quantity: 90,   # refills: 0  Last Office Visit: 9/22/20  Future Office visit:       Routing refill request to provider for review/approval because:

## 2022-07-21 ENCOUNTER — OFFICE VISIT (OUTPATIENT)
Dept: INTERNAL MEDICINE | Facility: OTHER | Age: 62
End: 2022-07-21
Attending: INTERNAL MEDICINE
Payer: COMMERCIAL

## 2022-07-21 VITALS
SYSTOLIC BLOOD PRESSURE: 132 MMHG | HEART RATE: 72 BPM | HEIGHT: 64 IN | WEIGHT: 208 LBS | BODY MASS INDEX: 35.51 KG/M2 | OXYGEN SATURATION: 98 % | TEMPERATURE: 97.7 F | DIASTOLIC BLOOD PRESSURE: 90 MMHG

## 2022-07-21 DIAGNOSIS — Z12.5 SCREENING FOR PROSTATE CANCER: Primary | ICD-10-CM

## 2022-07-21 DIAGNOSIS — B35.6 JOCK ITCH: ICD-10-CM

## 2022-07-21 DIAGNOSIS — Z12.11 COLON CANCER SCREENING: ICD-10-CM

## 2022-07-21 DIAGNOSIS — Z00.00 ROUTINE HISTORY AND PHYSICAL EXAMINATION OF ADULT: ICD-10-CM

## 2022-07-21 DIAGNOSIS — I10 HYPERTENSION, UNSPECIFIED TYPE: ICD-10-CM

## 2022-07-21 LAB
ANION GAP SERPL CALCULATED.3IONS-SCNC: 5 MMOL/L (ref 3–14)
BASOPHILS # BLD AUTO: 0.1 10E3/UL (ref 0–0.2)
BASOPHILS NFR BLD AUTO: 1 %
BUN SERPL-MCNC: 16 MG/DL (ref 7–30)
CALCIUM SERPL-MCNC: 8.8 MG/DL (ref 8.5–10.1)
CHLORIDE BLD-SCNC: 107 MMOL/L (ref 94–109)
CO2 SERPL-SCNC: 27 MMOL/L (ref 20–32)
CREAT SERPL-MCNC: 1.03 MG/DL (ref 0.66–1.25)
EOSINOPHIL # BLD AUTO: 0.4 10E3/UL (ref 0–0.7)
EOSINOPHIL NFR BLD AUTO: 6 %
ERYTHROCYTE [DISTWIDTH] IN BLOOD BY AUTOMATED COUNT: 12.7 % (ref 10–15)
GFR SERPL CREATININE-BSD FRML MDRD: 83 ML/MIN/1.73M2
GLUCOSE BLD-MCNC: 112 MG/DL (ref 70–99)
HCT VFR BLD AUTO: 45.5 % (ref 40–53)
HGB BLD-MCNC: 16.2 G/DL (ref 13.3–17.7)
LYMPHOCYTES # BLD AUTO: 2.1 10E3/UL (ref 0.8–5.3)
LYMPHOCYTES NFR BLD AUTO: 30 %
MCH RBC QN AUTO: 30.3 PG (ref 26.5–33)
MCHC RBC AUTO-ENTMCNC: 35.6 G/DL (ref 31.5–36.5)
MCV RBC AUTO: 85 FL (ref 78–100)
MONOCYTES # BLD AUTO: 0.7 10E3/UL (ref 0–1.3)
MONOCYTES NFR BLD AUTO: 9 %
NEUTROPHILS # BLD AUTO: 3.8 10E3/UL (ref 1.6–8.3)
NEUTROPHILS NFR BLD AUTO: 54 %
PLATELET # BLD AUTO: 158 10E3/UL (ref 150–450)
POTASSIUM BLD-SCNC: 3.9 MMOL/L (ref 3.4–5.3)
PSA SERPL-MCNC: 1.83 UG/L (ref 0–4)
RBC # BLD AUTO: 5.34 10E6/UL (ref 4.4–5.9)
SODIUM SERPL-SCNC: 139 MMOL/L (ref 133–144)
WBC # BLD AUTO: 7 10E3/UL (ref 4–11)

## 2022-07-21 PROCEDURE — 85004 AUTOMATED DIFF WBC COUNT: CPT | Mod: ZL | Performed by: INTERNAL MEDICINE

## 2022-07-21 PROCEDURE — 80048 BASIC METABOLIC PNL TOTAL CA: CPT | Mod: ZL | Performed by: INTERNAL MEDICINE

## 2022-07-21 PROCEDURE — 36415 COLL VENOUS BLD VENIPUNCTURE: CPT | Mod: ZL | Performed by: INTERNAL MEDICINE

## 2022-07-21 PROCEDURE — 99214 OFFICE O/P EST MOD 30 MIN: CPT | Performed by: INTERNAL MEDICINE

## 2022-07-21 PROCEDURE — G0103 PSA SCREENING: HCPCS | Mod: ZL | Performed by: INTERNAL MEDICINE

## 2022-07-21 PROCEDURE — G0463 HOSPITAL OUTPT CLINIC VISIT: HCPCS

## 2022-07-21 RX ORDER — LISINOPRIL 10 MG/1
10 TABLET ORAL DAILY
Qty: 90 TABLET | Refills: 1 | Status: SHIPPED | OUTPATIENT
Start: 2022-07-21 | End: 2022-11-10

## 2022-07-21 ASSESSMENT — ANXIETY QUESTIONNAIRES
5. BEING SO RESTLESS THAT IT IS HARD TO SIT STILL: SEVERAL DAYS
GAD7 TOTAL SCORE: 3
6. BECOMING EASILY ANNOYED OR IRRITABLE: SEVERAL DAYS
4. TROUBLE RELAXING: NOT AT ALL
7. FEELING AFRAID AS IF SOMETHING AWFUL MIGHT HAPPEN: SEVERAL DAYS
1. FEELING NERVOUS, ANXIOUS, OR ON EDGE: NOT AT ALL
2. NOT BEING ABLE TO STOP OR CONTROL WORRYING: NOT AT ALL
GAD7 TOTAL SCORE: 3
3. WORRYING TOO MUCH ABOUT DIFFERENT THINGS: NOT AT ALL

## 2022-07-21 ASSESSMENT — PAIN SCALES - GENERAL: PAINLEVEL: NO PAIN (0)

## 2022-07-21 ASSESSMENT — PATIENT HEALTH QUESTIONNAIRE - PHQ9: SUM OF ALL RESPONSES TO PHQ QUESTIONS 1-9: 3

## 2022-07-21 NOTE — PROGRESS NOTES
"  Assessment & Plan   Problem List Items Addressed This Visit    None     Visit Diagnoses     Screening for prostate cancer    -  Primary    Relevant Orders    PSA, screen    Hypertension, unspecified type        Relevant Medications    lisinopril (ZESTRIL) 10 MG tablet    Other Relevant Orders    Basic metabolic panel    Routine history and physical examination of adult        Relevant Orders    CBC with platelets and differential (Completed)    Colon cancer screening        Relevant Orders    COLOGUARD([employer]) (Completed)    Jock itch    OTC Lotrimin spray              25 minutes spent on the date of the encounter doing chart review, review of test results, interpretation of tests, patient visit and documentation        BMI:   Estimated body mass index is 35.7 kg/m  as calculated from the following:    Height as of this encounter: 1.626 m (5' 4\").    Weight as of this encounter: 94.3 kg (208 lb).           No follow-ups on file.    Nathen Leon, [facility]    Deo Terrell is a 62 year old, presenting for the following health issues:  Hypertension      HPI     Xander presents today for follow up.  He has a history of HTN and asks if he can stop his Lisinopril.  His BP is still a bit high even on the BP medication and I di recommend he stay on it.  He has not been seen in a year and a half.  He was told if he could lose some weight and watch his diet he might be able to come off the medication possibly at some point, but not currently.    He is also do for colon cancer screening and prostate cancer screening.  He also reported some jock itch and we discussed Lamisil spray OTC.      Hypertension Follow-up      Do you check your blood pressure regularly outside of the clinic? No     Are you following a low salt diet? Yes    Are your blood pressures ever more than 140 on the top number (systolic) OR more   than 90 on the bottom number (diastolic), for example 140/90? " "No          Review of Systems   Constitutional, HEENT, cardiovascular, pulmonary, gi and gu systems are negative, except as otherwise noted.      Objective    BP (!) 132/90   Pulse 72   Temp 97.7  F (36.5  C) (Tympanic)   Ht 1.626 m (5' 4\")   Wt 94.3 kg (208 lb)   SpO2 98%   BMI 35.70 kg/m    Body mass index is 35.7 kg/m .  Physical Exam   GENERAL:  alert and no distress  RESP: lungs clear to auscultation - no rales, rhonchi or wheezes  CV: regular rate and rhythm, normal S1 S2, no S3 or S4, no murmur, click or rub, no peripheral edema and peripheral pulses strong  ABD: Obese  MS: no gross musculoskeletal defects noted, no edema  SKIN: no suspicious lesions or rashes  NEURO: Normal strength and tone, mentation intact and speech normal  PSYCH: mentation appears normal, affect normal/bright    Office Visit on 11/04/2020   Component Date Value Ref Range Status     WBC 11/04/2020 7.2  4.0 - 11.0 10e9/L Final     RBC Count 11/04/2020 5.53  4.4 - 5.9 10e12/L Final     Hemoglobin 11/04/2020 16.3  13.3 - 17.7 g/dL Final     Hematocrit 11/04/2020 47.2  40.0 - 53.0 % Final     MCV 11/04/2020 85  78 - 100 fl Final     MCH 11/04/2020 29.5  26.5 - 33.0 pg Final     MCHC 11/04/2020 34.5  31.5 - 36.5 g/dL Final     RDW 11/04/2020 13.0  10.0 - 15.0 % Final     Platelet Count 11/04/2020 167  150 - 450 10e9/L Final     % Neutrophils 11/04/2020 53.2  % Final     % Lymphocytes 11/04/2020 33.6  % Final     % Monocytes 11/04/2020 8.2  % Final     % Eosinophils 11/04/2020 4.3  % Final     % Basophils 11/04/2020 0.7  % Final     Absolute Neutrophil 11/04/2020 3.8  1.6 - 8.3 10e9/L Final     Absolute Lymphocytes 11/04/2020 2.4  0.8 - 5.3 10e9/L Final     Absolute Monocytes 11/04/2020 0.6  0.0 - 1.3 10e9/L Final     Absolute Eosinophils 11/04/2020 0.3  0.0 - 0.7 10e9/L Final     Absolute Basophils 11/04/2020 0.1  0.0 - 0.2 10e9/L Final     Diff Method 11/04/2020 Automated Method   Final     PSA 11/04/2020 1.95  0 - 4 ug/L Final    " Assay Method:  Chemiluminescence using Siemens Vista analyzer     Sodium 11/04/2020 139  133 - 144 mmol/L Final     Potassium 11/04/2020 4.0  3.4 - 5.3 mmol/L Final     Chloride 11/04/2020 105  94 - 109 mmol/L Final     Carbon Dioxide 11/04/2020 26  20 - 32 mmol/L Final     Anion Gap 11/04/2020 8  3 - 14 mmol/L Final     Glucose 11/04/2020 108 (A) 70 - 99 mg/dL Final     Urea Nitrogen 11/04/2020 19  7 - 30 mg/dL Final     Creatinine 11/04/2020 1.10  0.66 - 1.25 mg/dL Final     GFR Estimate 11/04/2020 73  >60 mL/min/[1.73_m2] Final    Comment: Non  GFR Calc  Starting 12/18/2018, serum creatinine based estimated GFR (eGFR) will be   calculated using the Chronic Kidney Disease Epidemiology Collaboration   (CKD-EPI) equation.       GFR Estimate If Black 11/04/2020 84  >60 mL/min/[1.73_m2] Final    Comment:  GFR Calc  Starting 12/18/2018, serum creatinine based estimated GFR (eGFR) will be   calculated using the Chronic Kidney Disease Epidemiology Collaboration   (CKD-EPI) equation.       Calcium 11/04/2020 8.6  8.5 - 10.1 mg/dL Final     Results for orders placed or performed in visit on 07/21/22   CBC with platelets and differential     Status: None   Result Value Ref Range    WBC Count 7.0 4.0 - 11.0 10e3/uL    RBC Count 5.34 4.40 - 5.90 10e6/uL    Hemoglobin 16.2 13.3 - 17.7 g/dL    Hematocrit 45.5 40.0 - 53.0 %    MCV 85 78 - 100 fL    MCH 30.3 26.5 - 33.0 pg    MCHC 35.6 31.5 - 36.5 g/dL    RDW 12.7 10.0 - 15.0 %    Platelet Count 158 150 - 450 10e3/uL    % Neutrophils 54 %    % Lymphocytes 30 %    % Monocytes 9 %    % Eosinophils 6 %    % Basophils 1 %    Absolute Neutrophils 3.8 1.6 - 8.3 10e3/uL    Absolute Lymphocytes 2.1 0.8 - 5.3 10e3/uL    Absolute Monocytes 0.7 0.0 - 1.3 10e3/uL    Absolute Eosinophils 0.4 0.0 - 0.7 10e3/uL    Absolute Basophils 0.1 0.0 - 0.2 10e3/uL   CBC with platelets and differential     Status: None    Narrative    The following orders were created for  panel order CBC with platelets and differential.  Procedure                               Abnormality         Status                     ---------                               -----------         ------                     CBC with platelets and d...[[id number]]                      Final result                 Please view results for these tests on the individual orders.     Results for orders placed or performed in visit on 07/21/22 (from the past 24 hour(s))   CBC with platelets and differential    Narrative    The following orders were created for panel order CBC with platelets and differential.  Procedure                               Abnormality         Status                     ---------                               -----------         ------                     CBC with platelets and d...[[id number]]                      Final result                 Please view results for these tests on the individual orders.   CBC with platelets and differential   Result Value Ref Range    WBC Count 7.0 4.0 - 11.0 10e3/uL    RBC Count 5.34 4.40 - 5.90 10e6/uL    Hemoglobin 16.2 13.3 - 17.7 g/dL    Hematocrit 45.5 40.0 - 53.0 %    MCV 85 78 - 100 fL    MCH 30.3 26.5 - 33.0 pg    MCHC 35.6 31.5 - 36.5 g/dL    RDW 12.7 10.0 - 15.0 %    Platelet Count 158 150 - 450 10e3/uL    % Neutrophils 54 %    % Lymphocytes 30 %    % Monocytes 9 %    % Eosinophils 6 %    % Basophils 1 %    Absolute Neutrophils 3.8 1.6 - 8.3 10e3/uL    Absolute Lymphocytes 2.1 0.8 - 5.3 10e3/uL    Absolute Monocytes 0.7 0.0 - 1.3 10e3/uL    Absolute Eosinophils 0.4 0.0 - 0.7 10e3/uL    Absolute Basophils 0.1 0.0 - 0.2 10e3/uL                   .  ..

## 2022-07-21 NOTE — PROGRESS NOTES
SUBJECTIVE:   CC: Xander Gutiérrez is an 62 year old male who presents for preventative health visit.     {Split Bill scripting  The purpose of this visit is to discuss your medical history and prevent health problems before you are sick. You may be responsible for a co-pay, coinsurance, or deductible if your visit today includes services such as checking on a sore throat, having an x-ray or lab test, or treating and evaluating a new or existing condition :263637}  {Patient advised of split billing (Optional):711178}  HPI  {Add if <65 person on Medicare  - Required Questions (Optional):395045}  {Outside tests to abstract? :866752}    {additional problems to add (Optional):401244}    Today's PHQ-2 Score:   PHQ-2 ( 1999 [employer]) 9/22/2020   Q1: Little interest or pleasure in doing things 0   Q2: Feeling down, depressed or hopeless 0   PHQ-2 Score 0   PHQ-2 Total Score (12-17 Years)- Positive if 3 or more points; Administer PHQ-A if positive 0       Abuse: Current or Past(Physical, Sexual or Emotional)- { :082255}  Do you feel safe in your environment? { :579893}    Have you ever done Advance Care Planning? (For example, a Health Directive, POLST, or a discussion with a medical provider or your loved ones about your wishes): { :309389}    Social History     Tobacco Use     Smoking status: Never Smoker     Smokeless tobacco: Never Used   Substance Use Topics     Alcohol use: Yes     {Rooming Staff- Complete this question if Prescreen response is not shown below for today's visit. If you drink alcohol do you typically have >3 drinks per day or >7 drinks per week? (Optional):550712}    No flowsheet data found.{add AUDIT responses (Optional) (A score of 7 for adult men is an indication of hazardous drinking; a score of 8 or more is an indication of an alcohol use disorder.  A score of 7 or more for adult women is an indication of hazardous drinking or an alchohol use disorder):202706}    Last PSA:   PSA   Date Value Ref  "Range Status   11/04/2020 1.95 0 - 4 ug/L Final     Comment:     Assay Method:  Chemiluminescence using Siemens Vista analyzer       Reviewed orders with patient. Reviewed health maintenance and updated orders accordingly - { :648273::\"Yes\"}  {Chronicprobdata (optional):212000}    Reviewed and updated as needed this visit by clinical staff                    Reviewed and updated as needed this visit by Provider                   {HISTORY OPTIONS (Optional):317666}    Review of Systems  {MALE ROS (Optional):354556::\"CONSTITUTIONAL: NEGATIVE for fever, chills, change in weight\",\"INTEGUMENTARY/SKIN: NEGATIVE for worrisome rashes, moles or lesions\",\"EYES: NEGATIVE for vision changes or irritation\",\"ENT: NEGATIVE for ear, mouth and throat problems\",\"RESP: NEGATIVE for significant cough or SOB\",\"CV: NEGATIVE for chest pain, palpitations or peripheral edema\",\"GI: NEGATIVE for nausea, abdominal pain, heartburn, or change in bowel habits\",\" male: negative for dysuria, hematuria, decreased urinary stream, erectile dysfunction, urethral discharge\",\"MUSCULOSKELETAL: NEGATIVE for significant arthralgias or myalgia\",\"NEURO: NEGATIVE for weakness, dizziness or paresthesias\",\"PSYCHIATRIC: NEGATIVE for changes in mood or affect\"}    OBJECTIVE:   There were no vitals taken for this visit.    Physical Exam  {Exam Choices (Optional):608035}    {Diagnostic Test Results (Optional):688122::\"Diagnostic Test Results:\",\"Labs reviewed in Epic\"}    ASSESSMENT/PLAN:   {Diag Picklist:324752}    {Patient advised of split billing (Optional):585115}    COUNSELING:   {MALE COUNSELING MESSAGES:362831::\"Reviewed preventive health counseling, as reflected in patient instructions\"}    Estimated body mass index is 38.28 kg/m  as calculated from the following:    Height as of 9/16/20: 1.626 m (5' 4\").    Weight as of 11/4/20: 101.2 kg (223 lb).     {Weight Management Plan (ACO) Complete if BMI is abnormal-  Ages 18-64  BMI >24.9.  Age 65+ with BMI <23 " or >30 (Optional):136935}    He reports that he has never smoked. He has never used smokeless tobacco.      Counseling Resources:  ATP IV Guidelines  Pooled Cohorts Equation Calculator  FRAX Risk Assessment  ICSI Preventive Guidelines  Dietary Guidelines for Americans, 2010  USDA's MyPlate  ASA Prophylaxis  Lung CA Screening    Nathen Leon DO  [facility]

## 2022-07-21 NOTE — NURSING NOTE
"Chief Complaint   Patient presents with     Hypertension       Initial BP (!) 144/86 (BP Location: Left arm, Patient Position: Chair)   Pulse 72   Temp 97.7  F (36.5  C) (Tympanic)   Ht 1.626 m (5' 4\")   Wt 94.3 kg (208 lb)   SpO2 98%   BMI 35.70 kg/m   Estimated body mass index is 35.7 kg/m  as calculated from the following:    Height as of this encounter: 1.626 m (5' 4\").    Weight as of this encounter: 94.3 kg (208 lb).  Medication Reconciliation: complete  ADRIANNE MENDIOLA LPN      "

## 2022-08-16 LAB — NONINV COLON CA DNA+OCC BLD SCRN STL QL: NEGATIVE

## 2022-10-13 ENCOUNTER — OFFICE VISIT (OUTPATIENT)
Dept: FAMILY MEDICINE | Facility: OTHER | Age: 62
End: 2022-10-13
Attending: NURSE PRACTITIONER
Payer: COMMERCIAL

## 2022-10-13 ENCOUNTER — TRANSFERRED RECORDS (OUTPATIENT)
Dept: HEALTH INFORMATION MANAGEMENT | Facility: CLINIC | Age: 62
End: 2022-10-13

## 2022-10-13 VITALS
OXYGEN SATURATION: 98 % | WEIGHT: 212 LBS | DIASTOLIC BLOOD PRESSURE: 90 MMHG | TEMPERATURE: 97.9 F | BODY MASS INDEX: 36.19 KG/M2 | SYSTOLIC BLOOD PRESSURE: 168 MMHG | RESPIRATION RATE: 16 BRPM | HEART RATE: 84 BPM | HEIGHT: 64 IN
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
## 2022-10-13 DIAGNOSIS — I16.0 HYPERTENSIVE URGENCY: ICD-10-CM

## 2022-10-13 DIAGNOSIS — S49.91XA TRAUMATIC INJURY OF RIGHT SHOULDER: ICD-10-CM

## 2022-10-13 DIAGNOSIS — R40.20 LOSS OF CONSCIOUSNESS (H): ICD-10-CM

## 2022-10-13 PROBLEM — Z91.89 FRAMINGHAM CARDIAC RISK 10-20% IN NEXT 10 YEARS: Status: ACTIVE | Noted: 2018-01-29

## 2022-10-13 PROBLEM — L40.9 PSORIASIS: Status: ACTIVE | Noted: 2017-05-22

## 2022-10-13 PROBLEM — H52.7 AMETROPIA: Status: ACTIVE | Noted: 2019-10-29

## 2022-10-13 PROBLEM — E66.9 OBESITY (BMI 30-39.9): Status: ACTIVE | Noted: 2018-01-08

## 2022-10-13 PROBLEM — H52.4 PRESBYOPIA: Status: ACTIVE | Noted: 2019-10-29

## 2022-10-13 PROBLEM — H25.093 SENILE INCIPIENT CATARACT OF BOTH EYES: Status: ACTIVE | Noted: 2019-10-29

## 2022-10-13 PROBLEM — H47.392: Status: ACTIVE | Noted: 2019-10-29

## 2022-10-13 PROBLEM — D31.32 NEVUS, CHOROIDAL, LEFT: Status: ACTIVE | Noted: 2019-10-29

## 2022-10-13 PROCEDURE — G0463 HOSPITAL OUTPT CLINIC VISIT: HCPCS | Performed by: NURSE PRACTITIONER

## 2022-10-13 PROCEDURE — 99214 OFFICE O/P EST MOD 30 MIN: CPT | Performed by: NURSE PRACTITIONER

## 2022-10-13 ASSESSMENT — PAIN SCALES - GENERAL: PAINLEVEL: SEVERE PAIN (6)

## 2022-10-13 NOTE — NURSING NOTE
"Chief Complaint   Patient presents with     Shoulder Pain       Initial BP (!) 168/90 (BP Location: Left arm, Patient Position: Sitting, Cuff Size: Adult Large)   Pulse 84   Temp 97.9  F (36.6  C) (Tympanic)   Resp 16   Ht 1.626 m (5' 4\")   Wt 96.2 kg (212 lb)   SpO2 98%   BMI 36.39 kg/m   Estimated body mass index is 36.39 kg/m  as calculated from the following:    Height as of this encounter: 1.626 m (5' 4\").    Weight as of this encounter: 96.2 kg (212 lb).  Medication Reconciliation: complete  Vianca Miller LPN    "

## 2022-10-13 NOTE — PROGRESS NOTES
"  Assessment & Plan     Loss of consciousness (H)  Sent to ER via private vehicle for additional workup. Rule to trauma to brain. Pt desired to drive self. Declines other transport options.   PCP updated.    Hypertensive urgency    Fall, initial encounter  - Orthopedic  Referral; Future    Traumatic injury of right shoulder  - Orthopedic  Referral; Future           BMI:   Estimated body mass index is 36.39 kg/m  as calculated from the following:    Height as of this encounter: 1.626 m (5' 4\").    Weight as of this encounter: 96.2 kg (212 lb).     Spoke with ER provider on phone.       No follow-ups on file.    Joanna Paulino, CNP  [facility]    Deo Terrell is a 62 year old presenting for right shoulder pain following fall yesterday while walking his dog. He tripped over the leash. What is interesting is that he reports a loss of consciousness basically mid air. Reports he last remembered flying through the air and does not remember a point of impact. Woke up on the ground with a throbbing right shoulder. He is not clear on if he hit his head. He was wearing a hat and denies there was damage to it. Head does not hurt.      Shoulder Pain      HPI         Pain History:  When did you first notice your pain? - Acute Pain   Have you seen anyone else for your pain? No  Where in your body do you have pain? Musculoskeletal problem/pain  Onset/Duration: 1 day  Description  Location: shoulder - right  Joint Swelling: No  Redness: No  Pain: YES  Warmth: No  Intensity:  moderate, severe  Progression of Symptoms:  same  Accompanying signs and symptoms:   Fevers: No  Numbness/tingling/weakness: YES- a little  History  Trauma to the area: YES- fall  Recent illness:  No  Previous similar problem: YES- injection prior  Previous evaluation:  No  Precipitating or alleviating factors:  Aggravating factors include: none  Therapies tried and outcome: heat and acetaminophen some " "relief      Patient is willing to go to ER. Desires to drive self. Drove himself here. Discussed that if no xray to right shoulder is done in ER today, I can see him tomorrow morning after acute head injury is ruled out. 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 Will place referral for ortho as well. Primary focus is on evaluation of the loss of consciousness.       1505- provider to provider handoff called to [facility] ED.     Review of Systems   Constitutional, HEENT, cardiovascular, pulmonary, gi and gu systems are negative, except as otherwise noted.      Objective    BP (!) 168/90 (BP Location: Left arm, Patient Position: Sitting, Cuff Size: Adult Large)   Pulse 84   Temp 97.9  F (36.6  C) (Tympanic)   Resp 16   Ht 1.626 m (5' 4\")   Wt 96.2 kg (212 lb)   SpO2 98%   BMI 36.39 kg/m    Body mass index is 36.39 kg/m .       Physical Exam   GENERAL: healthy, alert and no distress  EYES: Eyes grossly normal to inspection, PERRL. Uneven tracking. No rotation or nystagmus.   RESP: lungs clear to auscultation - no rales, rhonchi or wheezes  CV: regular rate and rhythm, normal S1 S2, no S3 or S4, no murmur, click or rub, no peripheral edema and peripheral pulses strong  MS: RIGHT SHOULDER: limited active ROM. Passive ROM restricted as well, limited by pain. Anterior tenderness. No point tenderness to spine.                 "

## 2022-11-08 NOTE — PROGRESS NOTES
"    Assessment & Plan   Problem List Items Addressed This Visit        Other    Blood transfusion declined because patient is Shinto   Other Visit Diagnoses     Acute pain of right shoulder    -  Primary    Relevant Orders    MR Shoulder Right w/o Contrast    Orthopedic  Referral    Hypertension, unspecified type        Relevant Medications    lisinopril (ZESTRIL) 20 MG tablet-increase from 10 mg to 20 mg daily.             Review of prior external note(s) from - Outside records from [facility]  30 minutes spent on the date of the encounter doing chart review, review of outside records, review of test results, interpretation of tests, patient visit, documentation and discussion with family        BMI:   Estimated body mass index is 36.9 kg/m  as calculated from the following:    Height as of 10/13/22: 1.626 m (5' 4\").    Weight as of this encounter: 97.5 kg (215 lb).           No follow-ups on file.    Nathen Leon, [facility]    Deo Terrell is a 62 year old, presenting for the following health issues:  Shoulder      HPI     Xander presents today for follow up.  He was seen on 10/13/22 following an incident where he tripped over his dog's leash while he was walking his dog in [place].  He subsequently fell rolling down a hill and reports LOC before even striking the ground.  Evidently he struck his right shoulder on the ground and hit a tree with it. He was seen by Joanna Paulino and sent to ER.  In the ER they did do xrays of the shoulder which were negative.  He was then later seen by sports medicine and given exercises to do with his right arm.  There was the question of right rotator cuff tear.    He still has right shoulder pain and difficult with internal rotation and abduction of right arm beyond 80 degrees.  He was also noted to have high BP in the ER and again here today. He denies chest pain or SOB.  Denies any recurrent syncope.        Pain " History:  When did you first notice your pain? - Acute Pain   Have you seen anyone else for your pain? Yes - Joanna Tervo 10/13/22- ED 10/13/22 per Joanna Paulino   Where in your body do you have pain? Musculoskeletal problem/pain  Onset/Duration: 10/12/22  Description  Location:  Shoulder - right  Joint Swelling: YES  Redness: No  Pain: YES - 7-8/10 with movement   Warmth: No  Intensity:  Moderate- Severe   Progression of Symptoms:  Improving- slowly - 10% since fall on 10/12/22  Accompanying signs and symptoms:   Fevers: No  Numbness/tingling/weakness: No  History  Trauma to the area: YES- fall on 10/12/22  Recent illness:  No  Previous similar problem: No  Previous evaluation:  YES- Joanna Tervo and ED   Precipitating or alleviating factors:  Aggravating factors include: movement   Therapies tried and outcome: ice, stretching, exercises, physical therapy and thera band from PT       Review of Systems   Constitutional, HEENT, cardiovascular, pulmonary, gi and gu systems are negative, except as otherwise noted.      Objective    BP (!) 164/100 (BP Location: Left arm, Patient Position: Sitting, Cuff Size: Adult Large)   Pulse 76   Temp 97.6  F (36.4  C) (Tympanic)   Resp 20   Wt 97.5 kg (215 lb)   SpO2 98%   BMI 36.90 kg/m    Body mass index is 36.9 kg/m .  Physical Exam   GENERAL:  alert and no distress  RESP: lungs clear to auscultation - no rales, rhonchi or wheezes  CV: regular rate and rhythm, normal S1 S2, no S3 or S4, no murmur, click or rub, no peripheral edema and peripheral pulses strong  MS: Limited internal rotation and abduction of the right shoulder.    NEURO: Normal strength and tone, mentation intact and speech normal  PSYCH: mentation appears normal, affect normal/bright    Office Visit on 07/21/2022   Component Date Value Ref Range Status     COLOGUARD-ABSTRACT 08/09/2022 Negative  Negative Final    Comment:   NEGATIVE TEST RESULT. A negative Cologuard result indicates a low likelihood that  a colorectal cancer (CRC) or advanced adenoma (adenomatous polyps with more advanced pre-malignant features)  is present. The chance that a person with a negative Cologuard test has a colorectal cancer is less than 1 in 1500 (negative predictive value >99.9%) or has an  advanced adenoma is less than  5.3% (negative predictive value 94.7%). These data are based on a prospective cross-sectional study of 10,000 individuals at average risk for colorectal cancer who were screened with both Cologuard and colonoscopy. (Brynn Padilla al, N Engl J Med 2014;370(14):6658-4788) The normal value (reference range) for this assay is negative.    COLOGUARD RE-SCREENING RECOMMENDATION: Periodic colorectal cancer screening is an important part of preventive healthcare for asymptomatic individuals at average risk for colorectal cancer.  Following a negative Cologuard result, the American Cancer Society and U.S.                            Multi-Society Task Force screening guidelines recommend a Cologuard re-screening interval of 3 years.   References: American Cancer Society Guideline for Colorectal Cancer Screening: https://www.cancer.org/cancer/colon-rectal-cancer/uhbzamwtx-cvxpnyonf-osxrvet/acs-recommendations.html.; Ant DK, Benita CR, Angelina EASTMANK, Colorectal Cancer Screening: Recommendations for Physicians and Patients from the U.S. Multi-Society Task Force on Colorectal Cancer Screening , Am J Gastroenterology 2017; 112:4208-0451.    TEST DESCRIPTION: Composite algorithmic analysis of stool DNA-biomarkers with hemoglobin immunoassay.   Quantitative values of individual biomarkers are not reportable and are not associated with individual biomarker result reference ranges. Cologuard is intended for colorectal cancer screening of adults of either sex, 45 years or older, who are at average-risk for colorectal cancer (CRC). Cologuard has been approved for use by the U.S. FDA. The performance of Cologuard was                             established in a cross sectional study of average-risk adults aged 50-84. Cologuard performance in patients ages 45 to 49 years was estimated by sub-group analysis of near-age groups. Colonoscopies performed for a positive result may find as the most clinically significant lesion: colorectal cancer [4.0%], advanced adenoma (including sessile serrated polyps greater than or equal to 1cm diameter) [20%] or non- advanced adenoma [31%]; or no colorectal neoplasia [45%]. These estimates are derived from a prospective cross-sectional screening study of 10,000 individuals at average risk for colorectal cancer who were screened with both Cologuard and colonoscopy. (Brynn ROBERTO et al, N Engl J Med 2014;370(14):5530-2443.) Cologuard may produce a false negative or false positive result (no colorectal cancer or precancerous polyp present at colonoscopy follow up). A negative Cologuard test result does not guarantee the absence of CRC or advanced adenoma (pre-cancer). The current Cologuard                            screening interval is every 3 years. (American Cancer Society and U.S. Multi-Society Task Force). Cologuard performance data in a 10,000 patient pivotal study using colonoscopy as the reference method can be accessed at the following location: www.Texifter/results. Additional description of the Cologuard test process, warnings and precautions can be found at www.Mediastreamrd.Atlas5D.       Sodium 07/21/2022 139  133 - 144 mmol/L Final     Potassium 07/21/2022 3.9  3.4 - 5.3 mmol/L Final     Chloride 07/21/2022 107  94 - 109 mmol/L Final     Carbon Dioxide (CO2) 07/21/2022 27  20 - 32 mmol/L Final     Anion Gap 07/21/2022 5  3 - 14 mmol/L Final     Urea Nitrogen 07/21/2022 16  7 - 30 mg/dL Final     Creatinine 07/21/2022 1.03  0.66 - 1.25 mg/dL Final     Calcium 07/21/2022 8.8  8.5 - 10.1 mg/dL Final     Glucose 07/21/2022 112 (H)  70 - 99 mg/dL Final     GFR Estimate 07/21/2022 83  >60 mL/min/1.73m2 Final     Effective December 21, 2021 eGFRcr in adults is calculated using the 2021 CKD-EPI creatinine equation which includes age and gender (Ange et al., NE, DOI: 10.1056/EPYZiq4728727)     Prostate Specific Antigen Screen 07/21/2022 1.83  0.00 - 4.00 ug/L Final     WBC Count 07/21/2022 7.0  4.0 - 11.0 10e3/uL Final     RBC Count 07/21/2022 5.34  4.40 - 5.90 10e6/uL Final     Hemoglobin 07/21/2022 16.2  13.3 - 17.7 g/dL Final     Hematocrit 07/21/2022 45.5  40.0 - 53.0 % Final     MCV 07/21/2022 85  78 - 100 fL Final     MCH 07/21/2022 30.3  26.5 - 33.0 pg Final     MCHC 07/21/2022 35.6  31.5 - 36.5 g/dL Final     RDW 07/21/2022 12.7  10.0 - 15.0 % Final     Platelet Count 07/21/2022 158  150 - 450 10e3/uL Final     % Neutrophils 07/21/2022 54  % Final     % Lymphocytes 07/21/2022 30  % Final     % Monocytes 07/21/2022 9  % Final     % Eosinophils 07/21/2022 6  % Final     % Basophils 07/21/2022 1  % Final     Absolute Neutrophils 07/21/2022 3.8  1.6 - 8.3 10e3/uL Final     Absolute Lymphocytes 07/21/2022 2.1  0.8 - 5.3 10e3/uL Final     Absolute Monocytes 07/21/2022 0.7  0.0 - 1.3 10e3/uL Final     Absolute Eosinophils 07/21/2022 0.4  0.0 - 0.7 10e3/uL Final     Absolute Basophils 07/21/2022 0.1  0.0 - 0.2 10e3/uL Final     No results found for any visits on 11/10/22.  No results found for this or any previous visit (from the past 24 hour(s)).

## 2022-11-10 ENCOUNTER — TELEPHONE (OUTPATIENT)
Dept: INTERNAL MEDICINE | Facility: OTHER | Age: 62
End: 2022-11-10

## 2022-11-10 ENCOUNTER — OFFICE VISIT (OUTPATIENT)
Dept: INTERNAL MEDICINE | Facility: OTHER | Age: 62
End: 2022-11-10
Attending: INTERNAL MEDICINE
Payer: COMMERCIAL

## 2022-11-10 VITALS
HEART RATE: 76 BPM | OXYGEN SATURATION: 98 % | TEMPERATURE: 97.6 F | DIASTOLIC BLOOD PRESSURE: 100 MMHG | SYSTOLIC BLOOD PRESSURE: 164 MMHG | RESPIRATION RATE: 20 BRPM | WEIGHT: 215 LBS | BODY MASS INDEX: 36.9 KG/M2

## 2022-11-10 DIAGNOSIS — I10 HYPERTENSION, UNSPECIFIED TYPE: ICD-10-CM

## 2022-11-10 DIAGNOSIS — M25.511 ACUTE PAIN OF RIGHT SHOULDER: Primary | ICD-10-CM

## 2022-11-10 PROCEDURE — G0463 HOSPITAL OUTPT CLINIC VISIT: HCPCS | Performed by: INTERNAL MEDICINE

## 2022-11-10 PROCEDURE — 99214 OFFICE O/P EST MOD 30 MIN: CPT | Performed by: INTERNAL MEDICINE

## 2022-11-10 RX ORDER — LISINOPRIL 20 MG/1
20 TABLET ORAL DAILY
Qty: 90 TABLET | Refills: 3 | Status: SHIPPED | OUTPATIENT
Start: 2022-11-10 | End: 2024-02-08

## 2022-11-10 ASSESSMENT — PAIN SCALES - GENERAL: PAINLEVEL: NO PAIN (1)

## 2022-11-10 NOTE — LETTER
November 10, 2022      Xander MCGINNIS Gutiérrez  [address] [place] [id number]        To Whom It May Concern:    Xander MCGINNIS Gutiérrez was seen on 11/10/22.  Please excuse him from work until further notice.          Sincerely,        Nathen Leon, DO

## 2022-11-16 ENCOUNTER — HOSPITAL ENCOUNTER (OUTPATIENT)
Dept: MRI IMAGING | Facility: HOSPITAL | Age: 62
Discharge: HOME | End: 2022-11-16
Attending: INTERNAL MEDICINE | Admitting: INTERNAL MEDICINE
Payer: COMMERCIAL

## 2022-11-16 DIAGNOSIS — M25.511 ACUTE PAIN OF RIGHT SHOULDER: ICD-10-CM

## 2022-11-16 PROCEDURE — 73221 MRI JOINT UPR EXTREM W/O DYE: CPT | Mod: RT

## 2022-12-09 ENCOUNTER — OFFICE VISIT (OUTPATIENT)
Dept: ORTHOPEDICS | Facility: OTHER | Age: 62
End: 2022-12-09
Attending: INTERNAL MEDICINE
Payer: COMMERCIAL

## 2022-12-09 ENCOUNTER — TELEPHONE (OUTPATIENT)
Dept: FAMILY MEDICINE | Facility: OTHER | Age: 62
End: 2022-12-09

## 2022-12-09 VITALS
BODY MASS INDEX: 37.39 KG/M2 | TEMPERATURE: 98.3 F | WEIGHT: 219 LBS | HEIGHT: 64 IN | DIASTOLIC BLOOD PRESSURE: 90 MMHG | SYSTOLIC BLOOD PRESSURE: 148 MMHG | RESPIRATION RATE: 16 BRPM | HEART RATE: 73 BPM | OXYGEN SATURATION: 98 %

## 2022-12-09 DIAGNOSIS — M25.511 ACUTE PAIN OF RIGHT SHOULDER: ICD-10-CM

## 2022-12-09 PROCEDURE — G0463 HOSPITAL OUTPT CLINIC VISIT: HCPCS

## 2022-12-09 PROCEDURE — 99203 OFFICE O/P NEW LOW 30 MIN: CPT | Performed by: ORTHOPAEDIC SURGERY

## 2022-12-09 ASSESSMENT — PAIN SCALES - GENERAL: PAINLEVEL: SEVERE PAIN (6)

## 2022-12-09 NOTE — PROGRESS NOTES
Service Date: 12/09/2022    This is a pleasant man, seen for right shoulder pain.  He is 62 years of age.  He is left hand dominant and it was in mid October, he was camping in [place] and tripped and landed real heavily on his shoulder.  Apparently a stick actually hit his lateral side of his shoulder pretty hard.  Unfortunately, when he fell, there was a possibility of loss of consciousness. Immediately remember some numbness into his fingers and down his arm and he has had pain and I think it is maybe improved a little bit.  He saw an orthopedic surgeon or a medical orthopedist more likely who had recommended some exercises with Theraband. His wife affirmed that he has had a cortisone injection in his right shoulder 6 or 8 years ago, but it had been really good and quite up until this injury, approximately 2 months ago.    So I looked at him clinically, he has guarding when he goes to forward flexion of his shoulder and I think obviously has some discomfort.  When I tested his strength with external rotation, I thought was really quite good.  He came in a really good effort and I think both shoulders had excellent strength with external rotation.  Neurovascular status is intact.  His  strength is good.  I think it sounds like what might have been a stinger at the time of injury, I think has resolved clinically.    He did have a diagnostic workup including an MRI.  The MRI showed some AC joint arthritis, not too severe.  There was some bursal-sided irregularity to the rotator cuff.  There was some tendinosis.  There is a partial-thickness tear on the articular side of the rotator cuff tendon involving the supraspinatus.  It looked to be pretty shallow and small and partial, 9 mm measured.    IMPRESSION:  I think this patient has an undersurface partial rotator cuff tear.  He is approximately 2 months after the injury.    PLAN:  I think we would recommend nonoperative care.  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  A note was written as far as recommendations, probably starting half-time would be appropriate at first. Hopefully with improvement going to regular duties.  I did write up notes for therapy.  I think a strengthening program is indicated.  He can take anti-inflammatories as well.  A nonnarcotic approach of course is necessary and indicated here.     If he is not better in the next couple of months, I would consider giving him an injection. Usually these type of situations will heal with a strengthening program and nonoperative care indicated.  Otherwise, we could do a rotator cuff repair, but I think that would not be the right approach presently. Thus, he can be seen on a p.r.n. basis.    IMPRESSION: Partial-thickness rotator cuff tear, right shoulder.  Presently recommending nonoperative care.    Balwinder Steel MD        D: 2022   T: 2022   MT: [id number]    Name:     CATHERINE ALEXIS  MRN:      [id number]        Account:      [id number]   :      1960           Service Date: 2022       Document: [id number]

## 2022-12-09 NOTE — TELEPHONE ENCOUNTER
Call placed to patient to discuss Disability paperwork received.     Patients spouse reporting patient is in an appointment during the time of call, inquiring if this writer will return call.     This writer will return call to patient on 12/12/22.

## 2022-12-16 ENCOUNTER — HOSPITAL ENCOUNTER (OUTPATIENT)
Dept: PHYSICAL THERAPY | Facility: OTHER | Age: 62
Discharge: HOME | End: 2022-12-16
Attending: ORTHOPAEDIC SURGERY | Admitting: ORTHOPAEDIC SURGERY
Payer: COMMERCIAL

## 2022-12-16 PROCEDURE — 97110 THERAPEUTIC EXERCISES: CPT | Mod: GP

## 2022-12-16 PROCEDURE — 97161 PT EVAL LOW COMPLEX 20 MIN: CPT | Mod: GP

## 2022-12-16 NOTE — PROGRESS NOTES
12/16/22 1247   General Information   Type of Visit Initial OP Ortho PT Evaluation   Start of Care Date 12/16/22   Referring Physician Dr. Steel   Patient/Family Goals Statement regain mobility of arm   Orders Evaluate and Treat   Date of Order 12/09/22   Certification Required? Yes   Medical Diagnosis R RCT   Surgical/Medical history reviewed Yes   Precautions/Limitations no known precautions/limitations   General Information Comments Past medical history-see chart   Body Part(s)   Body Part(s) Shoulder   Presentation and Etiology   Pertinent history of current problem (include personal factors and/or comorbidities that impact the POC) Patient reports he injured his right shoulder on 10/12/2022 when camping and tripping over his dog and falling.  Following his fall he noted pain and numbness from his shoulder to his fingers.  Since the injury he has been seen by physician at [facility] and was given Thera-Band and instructions in a few exercises which patient did try however has not continued with them.  He did have an MRI and was referred to orthopedics.  He was seen by Dr. Steel on 12/09/2022 and is now referred to physical therapy.  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  He is to follow-up with  out as needed.  Patient reports no surgery has been recommended at this time.  Patient reports he does recall having cortisone injections to the right shoulder 6 or more years ago but does not recall the specifics of his pain etc. at that time.   Impairments A. Pain;E. Decreased flexibility;D. Decreased ROM   Functional Limitations perform activities of daily living;perform required work activities;perform desired leisure / sports activities   Symptom Location Pain is located primarily along the lateral and anterior shoulder/upper arm.  He notes intermittent tingling in the right upper extremity from the shoulder to the fingertips with certain  "positions.   How/Where did it occur With a fall   Onset date of current episode/exacerbation 10/12/22   Chronicity New   Pain rating (0-10 point scale) Best (/10);Worst (/10)   Best (/10) 0   Worst (/10) 7   Pain quality B. Dull;C. Aching   Frequency of pain/symptoms D. Other   Pain frequency comment \"Most of the time \"   Pain/symptoms are: The same all the time   Pain/symptoms exacerbated by H. Overhead reach;G. Certain positions;K. Home tasks;L. Work tasks;J. ADL;C. Lifting;D. Carrying;M. Other   Pain exacerbation comment Sleeping-lying on right side, washing hair, reaching up to a high shelf, donning doffing pullover shirts   Pain/symptoms eased by B. Walking;C. Rest;E. Changing positions   Progression of symptoms since onset: Improved   Current / Previous Interventions   Diagnostic Tests: MRI   MRI Results Results   MRI results Tenderness in the supraspinatus and subscapularis tendons with partial-thickness tearing but no definite full-thickness tear or tendon retraction.  Tendinosis intra-articular portion of the biceps tendon   Current Level of Function   Patient role/employment history A. 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 No   Have you fallen and had an injury in the past year? Yes   Is patient a fall risk? No   Abuse Screen (yes response referral indicated)   Feels Unsafe at Home or Work/School no   Feels Threatened by Someone no   Does Anyone Try to Keep You From Having Contact with Others or Doing Things Outside Your Home? no   Physical Signs of Abuse Present no   Patient needs abuse support services and resources No   Shoulder Objective Findings   Side (if bilateral, select both right and left) Right   Cervical Screen (ROM, quadrant) Within functional limits and pain-free   Shoulder Flexibility Comments Decreased strength especially with flexion and abduction.  Pain with resisted " abduction especially   Neer's Test Positive   Shoulder Special Tests Comments Positive empty can test   Palpation Tenderness to palpation along the right rotator cuff tendon insertion and along the lateral upper arm.  He did have soft tissue tension along the right upper trapezius and levator scapular muscles especially   Accessory Motion/Joint Mobility Decreased glenohumeral joint AP glide   Observation No acute distress   Posture Forward head, rounded shoulder posture   Right Shoulder Flexion AROM 0 to 90 degrees with lateral shoulder upper arm pain   Right Shoulder Flexion PROM 0 to 95 degrees   Right Shoulder Abduction AROM 0 to 65 degrees with anterior shoulder pain   Right Shoulder Abduction PROM 0 to 85 degrees   Right Shoulder ER AROM 0 to 40 degrees with arm in 50 degrees of abduction   Right Shoulder ER PROM Within normal limits with arm at side   Right Shoulder IR AROM Patient is able to reach the right posterior hip   Right Shoulder IR PROM Within normal limits with arm at side   Planned Therapy Interventions   Planned Therapy Interventions manual therapy;ROM;strengthening;stretching   Planned Modality Interventions   Planned Modality Interventions Comments Modalities as indicated   Clinical Impression   Criteria for Skilled Therapeutic Interventions Met yes, treatment indicated   PT Diagnosis Right rotator cuff tear/tendinosis with limited mobility and strength   Influenced by the following impairments Pain, decreased mobility, decreased strength   Functional limitations due to impairments Lifting, carrying, reaching, ADLs, work and home tasks, sleeping   Clinical Presentation Stable/Uncomplicated   Clinical Presentation Rationale Clinical judgment-due to lack of comorbidities that may negatively influence anticipated PT outcome   Clinical Decision Making (Complexity) Low complexity   Therapy Frequency 2 times/Week   Predicted Duration of Therapy Intervention (days/wks) Up to 8 weeks   Risk & Benefits  of therapy have been explained Yes   Patient, Family & other staff in agreement with plan of care Yes   Clinical Impression Comments Patient presents with MRI findings significant for partial-thickness rotator cuff tear along with tendinosis and objective findings of decreased mobility and strength in the right upper extremity   Education Assessment   Barriers to Learning No barriers   ORTHO GOALS   PT Ortho Eval Goals 1;2;3   Ortho Goal 1   Goal Identifier STG 1   Goal Description Decreased pain when at its worst to a 6/10   Target Date 12/30/22   Ortho Goal 2   Goal Identifier LTG 1   Goal Description Patient will demonstrate independence with home exercise program   Target Date 02/10/23   Ortho Goal 3   Goal Identifier LTG 2   Goal Description Patient will be able to reach onto overhead shelf   Target Date 02/10/23   Total Evaluation Time   PT Eval, Low Complexity Minutes (11412) 25   Therapy Certification   Certification date from 12/16/22   Certification date to 03/16/23   Medical Diagnosis R RCT       I certify the need for these services furnished under this plan of treatment and while under my care. (Physician co-signature of this document indicates review and certification of the therapy plan).      _____________________________     __________________________    ____________  Physician's Signature                 Date               Time

## 2022-12-16 NOTE — PROGRESS NOTES
12/16/22 1247   General Information   Type of Visit Initial OP Ortho PT Evaluation   Start of Care Date 12/16/22   Referring Physician Dr. Steel   Patient/Family Goals Statement regain mobility of arm   Orders Evaluate and Treat   Date of Order 12/09/22   Certification Required? Yes   Medical Diagnosis R RCT   Surgical/Medical history reviewed Yes   Precautions/Limitations no known precautions/limitations   General Information Comments Past medical history-see chart   Body Part(s)   Body Part(s) Shoulder   Presentation and Etiology   Pertinent history of current problem (include personal factors and/or comorbidities that impact the POC) Patient reports he injured his right shoulder on 10/12/2022 when camping and tripping over his dog and falling.  Following his fall he noted pain and numbness from his shoulder to his fingers.  Since the injury he has been seen by physician at [facility] and was given Thera-Band and instructions in a few exercises which patient did try however has not continued with them.  He did have an MRI and was referred to orthopedics.  He was seen by Dr. Steel on 12/09/2022 and is now referred to physical therapy.  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  He is to follow-up with  out as needed.  Patient reports no surgery has been recommended at this time.  Patient reports he does recall having cortisone injections to the right shoulder 6 or more years ago but does not recall the specifics of his pain etc. at that time.   Impairments A. Pain;E. Decreased flexibility;D. Decreased ROM   Functional Limitations perform activities of daily living;perform required work activities;perform desired leisure / sports activities   Symptom Location Pain is located primarily along the lateral and anterior shoulder/upper arm.  He notes intermittent tingling in the right upper extremity from the shoulder to the fingertips with certain  "positions.   How/Where did it occur With a fall   Onset date of current episode/exacerbation 10/12/22   Chronicity New   Pain rating (0-10 point scale) Best (/10);Worst (/10)   Best (/10) 0   Worst (/10) 7   Pain quality B. Dull;C. Aching   Frequency of pain/symptoms D. Other   Pain frequency comment \"Most of the time \"   Pain/symptoms are: The same all the time   Pain/symptoms exacerbated by H. Overhead reach;G. Certain positions;K. Home tasks;L. Work tasks;J. ADL;C. Lifting;D. Carrying;M. Other   Pain exacerbation comment Sleeping-lying on right side, washing hair, reaching up to a high shelf, donning doffing pullover shirts   Pain/symptoms eased by B. Walking;C. Rest;E. Changing positions   Progression of symptoms since onset: Improved   Current / Previous Interventions   Diagnostic Tests: MRI   MRI Results Results   MRI results Tenderness in the supraspinatus and subscapularis tendons with partial-thickness tearing but no definite full-thickness tear or tendon retraction.  Tendinosis intra-articular portion of the biceps tendon   Current Level of Function   Patient role/employment history A. 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 No   Have you fallen and had an injury in the past year? Yes   Is patient a fall risk? No   Abuse Screen (yes response referral indicated)   Feels Unsafe at Home or Work/School no   Feels Threatened by Someone no   Does Anyone Try to Keep You From Having Contact with Others or Doing Things Outside Your Home? no   Physical Signs of Abuse Present no   Patient needs abuse support services and resources No   Shoulder Objective Findings   Side (if bilateral, select both right and left) Right   Cervical Screen (ROM, quadrant) Within functional limits and pain-free   Shoulder Flexibility Comments Decreased strength especially with flexion and abduction.  Pain with resisted " abduction especially   Neer's Test Positive   Shoulder Special Tests Comments Positive empty can test   Palpation Tenderness to palpation along the right rotator cuff tendon insertion and along the lateral upper arm.  He did have soft tissue tension along the right upper trapezius and levator scapular muscles especially   Accessory Motion/Joint Mobility Decreased glenohumeral joint AP glide   Observation No acute distress   Posture Forward head, rounded shoulder posture   Right Shoulder Flexion AROM 0 to 90 degrees with lateral shoulder upper arm pain   Right Shoulder Flexion PROM 0 to 95 degrees   Right Shoulder Abduction AROM 0 to 65 degrees with anterior shoulder pain   Right Shoulder Abduction PROM 0 to 85 degrees   Right Shoulder ER AROM 0 to 40 degrees with arm in 50 degrees of abduction   Right Shoulder ER PROM Within normal limits with arm at side   Right Shoulder IR AROM Patient is able to reach the right posterior hip   Right Shoulder IR PROM Within normal limits with arm at side   Planned Therapy Interventions   Planned Therapy Interventions manual therapy;ROM;strengthening;stretching   Planned Modality Interventions   Planned Modality Interventions Comments Modalities as indicated   Clinical Impression   Criteria for Skilled Therapeutic Interventions Met yes, treatment indicated   PT Diagnosis Right rotator cuff tear/tendinosis with limited mobility and strength   Influenced by the following impairments Pain, decreased mobility, decreased strength   Functional limitations due to impairments Lifting, carrying, reaching, ADLs, work and home tasks, sleeping   Clinical Presentation Stable/Uncomplicated   Clinical Presentation Rationale Clinical judgment-due to lack of comorbidities that may negatively influence anticipated PT outcome   Clinical Decision Making (Complexity) Low complexity   Therapy Frequency 2 times/Week   Predicted Duration of Therapy Intervention (days/wks) Up to 8 weeks   Risk & Benefits  of therapy have been explained Yes   Patient, Family & other staff in agreement with plan of care Yes   Clinical Impression Comments Patient presents with MRI findings significant for partial-thickness rotator cuff tear along with tendinosis and objective findings of decreased mobility and strength in the right upper extremity   Education Assessment   Barriers to Learning No barriers   ORTHO GOALS   PT Ortho Eval Goals 1;2;3   Ortho Goal 1   Goal Identifier STG 1   Goal Description Decreased pain when at its worst to a 6/10   Target Date 12/30/22   Ortho Goal 2   Goal Identifier LTG 1   Goal Description Patient will demonstrate independence with home exercise program   Target Date 02/10/23   Ortho Goal 3   Goal Identifier LTG 2   Goal Description Patient will be able to reach onto overhead shelf   Target Date 02/10/23   Total Evaluation Time   PT Eval, Low Complexity Minutes (81107) 25   Therapy Certification   Certification date from 12/16/22   Certification date to 03/16/23   Medical Diagnosis R RCT

## 2022-12-22 ENCOUNTER — HOSPITAL ENCOUNTER (OUTPATIENT)
Dept: PHYSICAL THERAPY | Facility: OTHER | Age: 62
Discharge: HOME | End: 2022-12-22
Attending: ORTHOPAEDIC SURGERY | Admitting: ORTHOPAEDIC SURGERY
Payer: COMMERCIAL

## 2022-12-22 PROCEDURE — 97110 THERAPEUTIC EXERCISES: CPT | Mod: GP

## 2022-12-27 ENCOUNTER — HOSPITAL ENCOUNTER (OUTPATIENT)
Dept: PHYSICAL THERAPY | Facility: OTHER | Age: 62
Discharge: HOME | End: 2022-12-27
Attending: ORTHOPAEDIC SURGERY | Admitting: ORTHOPAEDIC SURGERY
Payer: COMMERCIAL

## 2022-12-27 PROCEDURE — 97140 MANUAL THERAPY 1/> REGIONS: CPT | Mod: GP

## 2022-12-27 PROCEDURE — 97110 THERAPEUTIC EXERCISES: CPT | Mod: GP

## 2023-01-03 ENCOUNTER — HOSPITAL ENCOUNTER (OUTPATIENT)
Dept: PHYSICAL THERAPY | Facility: OTHER | Age: 63
Discharge: HOME | End: 2023-01-03
Attending: ORTHOPAEDIC SURGERY | Admitting: ORTHOPAEDIC SURGERY
Payer: COMMERCIAL

## 2023-01-03 PROCEDURE — 97110 THERAPEUTIC EXERCISES: CPT | Mod: GP

## 2023-01-03 PROCEDURE — 97140 MANUAL THERAPY 1/> REGIONS: CPT | Mod: GP

## 2023-01-05 ENCOUNTER — HOSPITAL ENCOUNTER (OUTPATIENT)
Dept: PHYSICAL THERAPY | Facility: OTHER | Age: 63
Discharge: HOME | End: 2023-01-05
Attending: ORTHOPAEDIC SURGERY | Admitting: ORTHOPAEDIC SURGERY
Payer: COMMERCIAL

## 2023-01-05 PROCEDURE — 97140 MANUAL THERAPY 1/> REGIONS: CPT | Mod: GP

## 2023-01-05 PROCEDURE — 97110 THERAPEUTIC EXERCISES: CPT | Mod: GP

## 2023-01-10 ENCOUNTER — HOSPITAL ENCOUNTER (OUTPATIENT)
Dept: PHYSICAL THERAPY | Facility: OTHER | Age: 63
Discharge: HOME | End: 2023-01-10
Attending: ORTHOPAEDIC SURGERY | Admitting: ORTHOPAEDIC SURGERY
Payer: COMMERCIAL

## 2023-01-10 PROCEDURE — 97140 MANUAL THERAPY 1/> REGIONS: CPT | Mod: GP

## 2023-01-10 PROCEDURE — 97110 THERAPEUTIC EXERCISES: CPT | Mod: GP

## 2023-01-12 ENCOUNTER — HOSPITAL ENCOUNTER (OUTPATIENT)
Dept: PHYSICAL THERAPY | Facility: OTHER | Age: 63
Discharge: HOME | End: 2023-01-12
Attending: ORTHOPAEDIC SURGERY | Admitting: ORTHOPAEDIC SURGERY
Payer: COMMERCIAL

## 2023-01-12 PROCEDURE — 97110 THERAPEUTIC EXERCISES: CPT | Mod: GP

## 2023-01-17 ENCOUNTER — HOSPITAL ENCOUNTER (OUTPATIENT)
Dept: PHYSICAL THERAPY | Facility: OTHER | Age: 63
Discharge: HOME | End: 2023-01-17
Attending: ORTHOPAEDIC SURGERY | Admitting: ORTHOPAEDIC SURGERY
Payer: COMMERCIAL

## 2023-01-17 PROCEDURE — 97110 THERAPEUTIC EXERCISES: CPT | Mod: GP

## 2023-01-19 ENCOUNTER — HOSPITAL ENCOUNTER (OUTPATIENT)
Dept: PHYSICAL THERAPY | Facility: OTHER | Age: 63
Discharge: HOME | End: 2023-01-19
Attending: ORTHOPAEDIC SURGERY | Admitting: ORTHOPAEDIC SURGERY
Payer: COMMERCIAL

## 2023-01-19 PROCEDURE — 97110 THERAPEUTIC EXERCISES: CPT | Mod: GP

## 2023-01-26 ENCOUNTER — HOSPITAL ENCOUNTER (OUTPATIENT)
Dept: PHYSICAL THERAPY | Facility: OTHER | Age: 63
Discharge: HOME | End: 2023-01-26
Attending: ORTHOPAEDIC SURGERY | Admitting: ORTHOPAEDIC SURGERY
Payer: COMMERCIAL

## 2023-01-26 PROCEDURE — 97110 THERAPEUTIC EXERCISES: CPT | Mod: GP

## 2023-05-02 NOTE — PROGRESS NOTES
"[facility] Rehabilitation Service    Outpatient Physical Therapy Discharge Note  Patient: Xander Gutiérrez  : 1960    Beginning/End Dates of Reporting Period:  22 to 23    Referring Provider: Dr. Steel    Therapy Diagnosis: R RCT     Client Self Report: Overall, he repots he is doing \"very good\". He rates his pain a 1-2/10 on average.    Objective Measurements:  Objective Measure: R shoulder PROM : flexion = 0-150, abd = 0-125, ER = 0-70, IR = WNL ( 70 degrees of abd)  Details: R shoulder AROM: flexion = 0-130, abduction = 0-89                                    Outcome Measures (most recent score):      Goals:  Goal Identifier STG 1   Goal Description Decreased pain when at its worst to a 6/10   Target Date 22   Date Met  22   Progress (detail required for progress note):       Goal Identifier LTG 1   Goal Description Patient will demonstrate independence with home exercise program   Target Date 02/10/23   Date Met  23   Progress (detail required for progress note):       Goal Identifier LTG 2   Goal Description Patient will be able to reach onto overhead shelf   Target Date 02/10/23   Date Met  23   Progress (detail required for progress note):       Goal Identifier     Goal Description     Target Date     Date Met      Progress (detail required for progress note):       Goal Identifier     Goal Description     Target Date     Date Met      Progress (detail required for progress note):       Goal Identifier     Goal Description     Target Date     Date Met      Progress (detail required for progress note):       Goal Identifier     Goal Description     Target Date     Date Met      Progress (detail required for progress note):       Goal Identifier     Goal Description     Target Date     Date Met      Progress (detail required for progress note):             Plan:  Discharge from " therapy.    Discharge:    Reason for Discharge: Patient has met all goals.    Equipment Issued: none    Discharge Plan: Patient to continue home program.

## 2023-10-12 DIAGNOSIS — I10 HYPERTENSION, UNSPECIFIED TYPE: ICD-10-CM

## 2023-10-13 RX ORDER — LISINOPRIL 10 MG/1
10 TABLET ORAL DAILY
Qty: 90 TABLET | Refills: 0 | Status: SHIPPED | OUTPATIENT
Start: 2023-10-13 | End: 2024-02-08

## 2023-10-13 NOTE — TELEPHONE ENCOUNTER
Lisinopril       Last Written Prescription Date:  11/10/2022  Last Fill Quantity: 90,   # refills: 3  Last Office Visit: 10/13/2022  Future Office visit:

## 2024-02-08 ENCOUNTER — OFFICE VISIT (OUTPATIENT)
Dept: INTERNAL MEDICINE | Facility: OTHER | Age: 64
End: 2024-02-08
Attending: INTERNAL MEDICINE
Payer: COMMERCIAL

## 2024-02-08 ENCOUNTER — TELEPHONE (OUTPATIENT)
Dept: INTERNAL MEDICINE | Facility: OTHER | Age: 64
End: 2024-02-08

## 2024-02-08 VITALS
OXYGEN SATURATION: 98 % | BODY MASS INDEX: 37.76 KG/M2 | TEMPERATURE: 97.2 F | WEIGHT: 220 LBS | HEART RATE: 56 BPM | SYSTOLIC BLOOD PRESSURE: 138 MMHG | DIASTOLIC BLOOD PRESSURE: 76 MMHG | RESPIRATION RATE: 20 BRPM

## 2024-02-08 DIAGNOSIS — Z12.5 SCREENING FOR PROSTATE CANCER: ICD-10-CM

## 2024-02-08 DIAGNOSIS — Z00.00 ROUTINE HISTORY AND PHYSICAL EXAMINATION OF ADULT: ICD-10-CM

## 2024-02-08 DIAGNOSIS — I10 HYPERTENSION, UNSPECIFIED TYPE: Primary | ICD-10-CM

## 2024-02-08 LAB
ALBUMIN SERPL BCG-MCNC: 4.4 G/DL (ref 3.5–5.2)
ALP SERPL-CCNC: 62 U/L (ref 40–150)
ALT SERPL W P-5'-P-CCNC: 26 U/L (ref 0–70)
ANION GAP SERPL CALCULATED.3IONS-SCNC: 8 MMOL/L (ref 7–15)
AST SERPL W P-5'-P-CCNC: 23 U/L (ref 0–45)
BASOPHILS # BLD AUTO: 0.1 10E3/UL (ref 0–0.2)
BASOPHILS NFR BLD AUTO: 1 %
BILIRUB SERPL-MCNC: 0.4 MG/DL
BUN SERPL-MCNC: 19.7 MG/DL (ref 8–23)
CALCIUM SERPL-MCNC: 8.8 MG/DL (ref 8.8–10.2)
CHLORIDE SERPL-SCNC: 104 MMOL/L (ref 98–107)
CHOLEST SERPL-MCNC: 180 MG/DL
CREAT SERPL-MCNC: 1.02 MG/DL (ref 0.67–1.17)
DEPRECATED HCO3 PLAS-SCNC: 25 MMOL/L (ref 22–29)
EGFRCR SERPLBLD CKD-EPI 2021: 83 ML/MIN/1.73M2
EOSINOPHIL # BLD AUTO: 0.4 10E3/UL (ref 0–0.7)
EOSINOPHIL NFR BLD AUTO: 6 %
ERYTHROCYTE [DISTWIDTH] IN BLOOD BY AUTOMATED COUNT: 12.7 % (ref 10–15)
FASTING STATUS PATIENT QL REPORTED: YES
GLUCOSE SERPL-MCNC: 95 MG/DL (ref 70–99)
HCT VFR BLD AUTO: 48.9 % (ref 40–53)
HDLC SERPL-MCNC: 43 MG/DL
HGB BLD-MCNC: 16.6 G/DL (ref 13.3–17.7)
IMM GRANULOCYTES # BLD: 0.1 10E3/UL
IMM GRANULOCYTES NFR BLD: 1 %
LDLC SERPL CALC-MCNC: 121 MG/DL
LYMPHOCYTES # BLD AUTO: 1.9 10E3/UL (ref 0.8–5.3)
LYMPHOCYTES NFR BLD AUTO: 33 %
MCH RBC QN AUTO: 29.5 PG (ref 26.5–33)
MCHC RBC AUTO-ENTMCNC: 33.9 G/DL (ref 31.5–36.5)
MCV RBC AUTO: 87 FL (ref 78–100)
MONOCYTES # BLD AUTO: 0.6 10E3/UL (ref 0–1.3)
MONOCYTES NFR BLD AUTO: 10 %
NEUTROPHILS # BLD AUTO: 2.8 10E3/UL (ref 1.6–8.3)
NEUTROPHILS NFR BLD AUTO: 49 %
NONHDLC SERPL-MCNC: 137 MG/DL
PLATELET # BLD AUTO: 167 10E3/UL (ref 150–450)
POTASSIUM SERPL-SCNC: 4.3 MMOL/L (ref 3.4–5.3)
PROT SERPL-MCNC: 6.8 G/DL (ref 6.4–8.3)
PSA SERPL DL<=0.01 NG/ML-MCNC: 2.86 NG/ML (ref 0–4.5)
RBC # BLD AUTO: 5.63 10E6/UL (ref 4.4–5.9)
SODIUM SERPL-SCNC: 137 MMOL/L (ref 135–145)
TRIGL SERPL-MCNC: 79 MG/DL
WBC # BLD AUTO: 5.7 10E3/UL (ref 4–11)

## 2024-02-08 PROCEDURE — 85025 COMPLETE CBC W/AUTO DIFF WBC: CPT | Mod: ZL | Performed by: INTERNAL MEDICINE

## 2024-02-08 PROCEDURE — 36415 COLL VENOUS BLD VENIPUNCTURE: CPT | Mod: ZL | Performed by: INTERNAL MEDICINE

## 2024-02-08 PROCEDURE — 80061 LIPID PANEL: CPT | Mod: ZL | Performed by: INTERNAL MEDICINE

## 2024-02-08 PROCEDURE — 80053 COMPREHEN METABOLIC PANEL: CPT | Mod: ZL | Performed by: INTERNAL MEDICINE

## 2024-02-08 PROCEDURE — G0463 HOSPITAL OUTPT CLINIC VISIT: HCPCS

## 2024-02-08 PROCEDURE — G0103 PSA SCREENING: HCPCS | Mod: ZL | Performed by: INTERNAL MEDICINE

## 2024-02-08 PROCEDURE — 99396 PREV VISIT EST AGE 40-64: CPT | Performed by: INTERNAL MEDICINE

## 2024-02-08 RX ORDER — LISINOPRIL 20 MG/1
20 TABLET ORAL DAILY
Qty: 90 TABLET | Refills: 3 | Status: SHIPPED | OUTPATIENT
Start: 2024-02-08

## 2024-02-08 RX ORDER — LISINOPRIL 10 MG/1
10 TABLET ORAL DAILY
Qty: 90 TABLET | Refills: 3 | Status: SHIPPED | OUTPATIENT
Start: 2024-02-08 | End: 2024-02-08

## 2024-02-08 SDOH — HEALTH STABILITY: PHYSICAL HEALTH: ON AVERAGE, HOW MANY DAYS PER WEEK DO YOU ENGAGE IN MODERATE TO STRENUOUS EXERCISE (LIKE A BRISK WALK)?: 4 DAYS

## 2024-02-08 ASSESSMENT — PAIN SCALES - GENERAL: PAINLEVEL: NO PAIN (1)

## 2024-02-08 ASSESSMENT — SOCIAL DETERMINANTS OF HEALTH (SDOH): HOW OFTEN DO YOU GET TOGETHER WITH FRIENDS OR RELATIVES?: MORE THAN THREE TIMES A WEEK

## 2024-02-08 NOTE — TELEPHONE ENCOUNTER
4:43 PM    Reason for Call: Phone Call    Description: Patient called in returning Kendra's phone call. Please call patient back.     Was an appointment offered for this call? No  If yes : Appointment type              Date    Preferred method for responding to this message: Telephone Call  What is your phone number ? [phone]     If we cannot reach you directly, may we leave a detailed response at the number you provided? Yes    Can this message wait until your PCP/provider returns, if available today? Not applicable, PROVIDER IN CLINIC    Marjan Haines

## 2024-02-08 NOTE — PROGRESS NOTES
Preventive Care Visit  [facility]  Nathen SOFIA Leon DO, Internal Medicine  Feb 8, 2024    Assessment & Plan   Problem List Items Addressed This Visit    None  Visit Diagnoses       Hypertension, unspecified type    -  Primary    Relevant Medications    lisinopril (ZESTRIL) 20 MG tablet    Routine history and physical examination of adult        Relevant Orders    Comprehensive metabolic panel (BMP + Alb, Alk Phos, ALT, AST, Total. Bili, TP)    CBC with platelets and differential    Lipid Profile (Chol, Trig, HDL, LDL calc)    Screening for prostate cancer        Relevant Orders    PSA, screen               25 minutes spent by me on the date of the encounter doing chart review, review of test results, interpretation of tests, patient visit, and documentation      Counseling  Appropriate preventive services were discussed with this patient, including applicable screening as appropriate for fall prevention, nutrition, physical activity, Tobacco-use cessation, weight loss and cognition.  Checklist reviewing preventive services available has been given to the patient.  Reviewed patient's diet, addressing concerns and/or questions.   The patient was instructed to see the dentist every 6 months.   He is at risk for psychosocial distress and has been provided with information to reduce risk.           No follow-ups on file.      Deo Terrell is a 63 year old, presenting for the following:  Physical         Health Care Directive  Patient does not have a Health Care Directive or Living Will: Patient states has Advance Directive and will bring in a copy to clinic.    ANNIE Terrell presents today for routine annual physical.  He does have a history of hypertension and is currently taking lisinopril 10 mg p.o. daily.  He otherwise is doing well and denies any complaints at this time.            2/8/2024   General Health   How would you rate your overall physical health? Excellent   Feel stress (tense, anxious,  or unable to sleep) Only a little   (!) STRESS CONCERN      2/8/2024   Nutrition   Three or more servings of calcium each day? Yes   Diet: Regular (no restrictions)   How many servings of fruit and vegetables per day? (!) 2-3   How many sweetened beverages each day? 0-1         2/8/2024   Exercise   Days per week of moderate/strenous exercise 4 days         2/8/2024   Social Factors   Frequency of gathering with friends or relatives More than three times a week   Worry food won't last until get money to buy more No   Food not last or not have enough money for food? No   Do you have housing?  Yes   Are you worried about losing your housing? No   Lack of transportation? No   Unable to get utilities (heat,electricity)? No         2/8/2024   Fall Risk   Fallen 2 or more times in the past year? No   Trouble with walking or balance? No          2/8/2024   Dental   Dentist two times every year? (!) NO         2/8/2024   TB Screening   Were you born outside of [place]?  No         Today's PHQ-2 Score:       2/8/2024     9:33 AM   PHQ-2 ( 1999 [employer])   Q1: Little interest or pleasure in doing things 0   Q2: Feeling down, depressed or hopeless 0   PHQ-2 Score 0           2/8/2024   Substance Use   Alcohol more than 3/day or more than 7/wk No   Do you use any other substances recreationally? No     Social History     Tobacco Use    Smoking status: Never    Smokeless tobacco: Never   Substance Use Topics    Alcohol use: Yes    Drug use: Not Currently             2/8/2024   One time HIV Screening   Previous HIV test? No         2/8/2024   STI Screening   New sexual partner(s) since last STI/HIV test? No   Last PSA:   PSA   Date Value Ref Range Status   11/04/2020 1.95 0 - 4 ug/L Final     Comment:     Assay Method:  Chemiluminescence using Siemens Vista analyzer     Prostate Specific Antigen Screen   Date Value Ref Range Status   07/21/2022 1.83 0.00 - 4.00 ug/L Final     The 10-year ASCVD risk score (Ligia VALVERDE, et al., 2019) is:  13.6%    Values used to calculate the score:      Age: 63 years      Sex: Male      Is Non- : No      Diabetic: No      Tobacco smoker: No      Systolic Blood Pressure: 138 mmHg      Is BP treated: Yes      HDL Cholesterol: 42 mg/dL      Total Cholesterol: 178 mg/dL           Reviewed and updated as needed this visit by Provider                    Past Medical History:   Diagnosis Date    Essential hypertension 9/15/2020    Hypertension      Past Surgical History:   Procedure Laterality Date    HERNIA REPAIR  2004    umbilical hernia      OB History   No obstetric history on file.     Lab work is in process  Labs reviewed in EPIC  BP Readings from Last 3 Encounters:   02/08/24 138/76   12/09/22 (!) 148/90   11/10/22 (!) 164/100    Wt Readings from Last 3 Encounters:   02/08/24 99.8 kg (220 lb)   12/09/22 99.3 kg (219 lb)   11/10/22 97.5 kg (215 lb)                  Patient Active Problem List   Diagnosis    ACP (advance care planning)    Essential hypertension    Morbid obesity (H)    Ametropia    [place] cardiac risk 10-20% in next 10 years    Nevus, choroidal, left    Obesity (BMI 30-39.9)    Optic nerve cupping of left eye    Presbyopia    Psoriasis    Senile incipient cataract of both eyes    Blood transfusion declined because patient is Yarsani     Past Surgical History:   Procedure Laterality Date    HERNIA REPAIR  2004    umbilical hernia        Social History     Tobacco Use    Smoking status: Never    Smokeless tobacco: Never   Substance Use Topics    Alcohol use: Yes     Family History   Problem Relation Age of Onset    Unknown/Adopted Mother     Unknown/Adopted Father     Unknown/Adopted Sister          Current Outpatient Medications   Medication Sig Dispense Refill    lisinopril (ZESTRIL) 20 MG tablet Take 1 tablet (20 mg) by mouth daily 90 tablet 3     No Known Allergies  Review of Systems    Review of Systems  Constitutional, HEENT, cardiovascular, pulmonary, gi and  "gu systems are negative, except as otherwise noted.     Objective    Exam  /76   Pulse 56   Temp 97.2  F (36.2  C) (Tympanic)   Resp 20   Wt 99.8 kg (220 lb)   SpO2 98%   BMI 37.76 kg/m     Estimated body mass index is 37.76 kg/m  as calculated from the following:    Height as of 12/9/22: 1.626 m (5' 4\").    Weight as of this encounter: 99.8 kg (220 lb).    Physical Exam  GENERAL: alert and no distress  EYES: Eyes grossly normal to inspection, PERRL and conjunctivae and sclerae normal  HENT: ear canals and TM's normal, nose and mouth without ulcers or lesions  RESP: lungs clear to auscultation - no rales, rhonchi or wheezes  CV: regular rate and rhythm, normal S1 S2, no S3 or S4, no murmur, click or rub, no peripheral edema  ABDOMEN: soft, nontender, no hepatosplenomegaly, no masses and bowel sounds normal  MS: no gross musculoskeletal defects noted, no edema  SKIN: no suspicious lesions or rashes  NEURO: Normal strength and tone, mentation intact and speech normal  PSYCH: mentation appears normal, affect normal/bright      Signed Electronically by: Nathen Leon,     "

## 2024-04-10 ENCOUNTER — OFFICE VISIT (OUTPATIENT)
Dept: INTERNAL MEDICINE | Facility: OTHER | Age: 64
End: 2024-04-10
Attending: INTERNAL MEDICINE
Payer: COMMERCIAL

## 2024-04-10 VITALS
SYSTOLIC BLOOD PRESSURE: 136 MMHG | BODY MASS INDEX: 38.05 KG/M2 | OXYGEN SATURATION: 95 % | HEART RATE: 79 BPM | TEMPERATURE: 98.1 F | WEIGHT: 221.7 LBS | RESPIRATION RATE: 18 BRPM | DIASTOLIC BLOOD PRESSURE: 72 MMHG

## 2024-04-10 DIAGNOSIS — I10 PRIMARY HYPERTENSION: ICD-10-CM

## 2024-04-10 DIAGNOSIS — Z01.818 PRE-OP EXAM: Primary | ICD-10-CM

## 2024-04-10 DIAGNOSIS — Z12.5 SCREENING FOR PROSTATE CANCER: ICD-10-CM

## 2024-04-10 LAB
ATRIAL RATE - MUSE: 65 BPM
DIASTOLIC BLOOD PRESSURE - MUSE: NORMAL MMHG
INTERPRETATION ECG - MUSE: NORMAL
P AXIS - MUSE: 54 DEGREES
PR INTERVAL - MUSE: 154 MS
QRS DURATION - MUSE: 100 MS
QT - MUSE: 420 MS
QTC - MUSE: 436 MS
R AXIS - MUSE: -36 DEGREES
SYSTOLIC BLOOD PRESSURE - MUSE: NORMAL MMHG
T AXIS - MUSE: 14 DEGREES
VENTRICULAR RATE- MUSE: 65 BPM

## 2024-04-10 PROCEDURE — G0463 HOSPITAL OUTPT CLINIC VISIT: HCPCS

## 2024-04-10 PROCEDURE — 93010 ELECTROCARDIOGRAM REPORT: CPT | Mod: 76 | Performed by: INTERNAL MEDICINE

## 2024-04-10 PROCEDURE — 99213 OFFICE O/P EST LOW 20 MIN: CPT | Performed by: INTERNAL MEDICINE

## 2024-04-10 PROCEDURE — 93005 ELECTROCARDIOGRAM TRACING: CPT | Performed by: INTERNAL MEDICINE

## 2024-04-10 ASSESSMENT — PAIN SCALES - GENERAL: PAINLEVEL: NO PAIN (0)

## 2024-04-10 NOTE — PROGRESS NOTES
Preoperative Evaluation  [facility]  [address]  [place]  [place] [address]  Phone: [phone]  Primary Provider: Nathen Ortiz  Pre-op Performing Provider: NATHEN ORTIZ  Apr 10, 2024       Xander is a 63 year old, presenting for the following:  Pre-Op Exam    Surgical Information  Surgery/Procedure: RT Eye Cataract   Surgery Location: [place]  Surgeon: Raffaele  Surgery Date: 4/25/24  Time of Surgery: TBD  Where patient plans to recover: At home with family  Fax number for surgical facility:     Assessment & Plan     The proposed surgical procedure is considered LOW risk.    Problem List Items Addressed This Visit          Circulatory    HTN (hypertension)     Other Visit Diagnoses       Pre-op exam    -  Primary    Relevant Orders    EKG 12-lead complete w/read - (Clinic Performed) (Completed)    Screening for prostate cancer                        - No identified additional risk factors other than previously addressed    Antiplatelet or Anticoagulation Medication Instructions   - Patient is on no antiplatelet or anticoagulation medications.    Additional Medication Instructions  No changes, may take Lisinopril morning of procedure.    Recommendation  APPROVAL GIVEN to proceed with proposed procedure, without further diagnostic evaluation.      20 minutes spent by me on the date of the encounter doing chart review, review of test results, interpretation of tests, patient visit, and documentation     Subjective       HPI related to upcoming procedure: right cataract        4/10/2024     2:15 PM   Preop Questions   1. Have you ever had a heart attack or stroke? No   2. Have you ever had surgery on your heart or blood vessels, such as a stent placement, a coronary artery bypass, or surgery on an artery in your head, neck, heart, or legs? No   3. Do you have chest pain with activity? No   4. Do you have a history of  heart failure? No   5. Do you currently  have a cold, bronchitis or symptoms of other infection? No   6. Do you have a cough, shortness of breath, or wheezing? No   7. Do you or anyone in your family have previous history of blood clots? UNKNOWN    8. Do you or does anyone in your family have a serious bleeding problem such as prolonged bleeding following surgeries or cuts? No   9. Have you ever had problems with anemia or been told to take iron pills? No   10. Have you had any abnormal blood loss such as black, tarry or bloody stools? No   11. Have you ever had a blood transfusion? No   12. Are you willing to have a blood transfusion if it is medically needed before, during, or after your surgery? NO    13. Have you or any of your relatives ever had problems with anesthesia? No   14. Do you have sleep apnea, excessive snoring or daytime drowsiness? No   15. Do you have any artifical heart valves or other implanted medical devices like a pacemaker, defibrillator, or continuous glucose monitor? No   16. Do you have artificial joints? No   17. Are you allergic to latex? No       Health Care Directive  Patient does not have a Health Care Directive or Living Will: Discussed advance care planning with patient; however, patient declined at this time.    [id number]}    Status of Chronic Conditions:  HYPERTENSION - Patient has longstanding history of HTN , currently denies any symptoms referable to elevated blood pressure. Specifically denies chest pain, palpitations, dyspnea, orthopnea, PND or peripheral edema. Blood pressure readings have been in normal range. Current medication regimen is as listed below. Patient denies any side effects of medication.     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  Notes:  Risk falls to 7% if blood pressure is 120      Obesity (BMI 30-39.9) 01/08/2018     Priority: Medium    Psoriasis 05/22/2017     Priority: Medium    ACP (advance care planning) 03/10/2017     Priority: Medium     Advance Care Planning 3/10/2017: Receipt of ACP document:  Received: invalid HCD document dated 5/16/02.  Document not previously scanned. Validation form completed indicating invalid document. Copy sent to client with information and facilitation resources. Validation form sent to be scanned as notation of invalid document received.   Confirmed/documented designated decision maker(s).  Added by Cira Marcus   Advance Care Planning Liaison                Past Medical History:   Diagnosis Date    Essential hypertension 9/15/2020    Hypertension      Past Surgical History:   Procedure Laterality Date    HERNIA REPAIR  2004    umbilical hernia      Current Outpatient Medications   Medication Sig Dispense Refill    lisinopril (ZESTRIL) 20 MG tablet Take 1 tablet (20 mg) by mouth daily 90 tablet 3       No Known Allergies     Social History     Tobacco Use    Smoking status: Never    Smokeless tobacco: Never   Substance Use Topics    Alcohol use: Yes     Family History   Problem Relation Age of Onset    Unknown/Adopted Mother     Unknown/Adopted Father     Unknown/Adopted Sister      History   Drug Use Unknown         Review of Systems    Review of  "Systems  Constitutional, HEENT, cardiovascular, pulmonary, gi and gu systems are negative, except as otherwise noted.    Objective    BP (!) 178/102 (BP Location: Left arm, Patient Position: Sitting, Cuff Size: Adult Large)   Pulse 79   Temp 98.1  F (36.7  C) (Tympanic)   Resp 18   Wt 100.6 kg (221 lb 11.2 oz)   SpO2 95%   BMI 38.05 kg/m     Estimated body mass index is 38.05 kg/m  as calculated from the following:    Height as of 12/9/22: 1.626 m (5' 4\").    Weight as of this encounter: 100.6 kg (221 lb 11.2 oz).  Physical Exam  GENERAL: alert and no distress  EYES: Eyes grossly normal to inspection, PERRL and conjunctivae and sclerae normal  HENT: ear canals and TM's normal, nose and mouth without ulcers or lesions  RESP: lungs clear to auscultation - no rales, rhonchi or wheezes  CV: regular rate and rhythm, normal S1 S2, no S3 or S4, no murmur, click or rub, no peripheral edema  ABDOMEN: soft, nontender, no hepatosplenomegaly, no masses and bowel sounds normal  MS: no gross musculoskeletal defects noted, no edema  SKIN: no suspicious lesions or rashes  PSYCH: mentation appears normal, affect normal/bright    Recent Labs   Lab Test 02/08/24  1015 07/21/22  1358   HGB 16.6 16.2    158    139   POTASSIUM 4.3 3.9   CR 1.02 1.03        Diagnostics  Lab Results   Component Value Date    WBC 5.7 02/08/2024    WBC 7.2 11/04/2020     Lab Results   Component Value Date    RBC 5.63 02/08/2024    RBC 5.53 11/04/2020     Lab Results   Component Value Date    HGB 16.6 02/08/2024    HGB 16.3 11/04/2020     Lab Results   Component Value Date    HCT 48.9 02/08/2024    HCT 47.2 11/04/2020     No components found for: \"MCT\"  Lab Results   Component Value Date    MCV 87 02/08/2024    MCV 85 11/04/2020     Lab Results   Component Value Date    MCH 29.5 02/08/2024    MCH 29.5 11/04/2020     Lab Results   Component Value Date    MCHC 33.9 02/08/2024    MCHC 34.5 11/04/2020     Lab Results   Component Value Date    " RDW 12.7 02/08/2024    RDW 13.0 11/04/2020     Lab Results   Component Value Date     02/08/2024     11/04/2020      Last Comprehensive Metabolic Panel:  Sodium   Date Value Ref Range Status   02/08/2024 137 135 - 145 mmol/L Final     Comment:     Reference intervals for this test were updated on 09/26/2023 to more accurately reflect our healthy population. There may be differences in the flagging of prior results with similar values performed with this method. Interpretation of those prior results can be made in the context of the updated reference intervals.    11/04/2020 139 133 - 144 mmol/L Final     Potassium   Date Value Ref Range Status   02/08/2024 4.3 3.4 - 5.3 mmol/L Final   07/21/2022 3.9 3.4 - 5.3 mmol/L Final   11/04/2020 4.0 3.4 - 5.3 mmol/L Final     Chloride   Date Value Ref Range Status   02/08/2024 104 98 - 107 mmol/L Final   07/21/2022 107 94 - 109 mmol/L Final   11/04/2020 105 94 - 109 mmol/L Final     Carbon Dioxide   Date Value Ref Range Status   11/04/2020 26 20 - 32 mmol/L Final     Carbon Dioxide (CO2)   Date Value Ref Range Status   02/08/2024 25 22 - 29 mmol/L Final   07/21/2022 27 20 - 32 mmol/L Final     Anion Gap   Date Value Ref Range Status   02/08/2024 8 7 - 15 mmol/L Final   07/21/2022 5 3 - 14 mmol/L Final   11/04/2020 8 3 - 14 mmol/L Final     Glucose   Date Value Ref Range Status   02/08/2024 95 70 - 99 mg/dL Final   07/21/2022 112 (H) 70 - 99 mg/dL Final   11/04/2020 108 (H) 70 - 99 mg/dL Final     Urea Nitrogen   Date Value Ref Range Status   02/08/2024 19.7 8.0 - 23.0 mg/dL Final   07/21/2022 16 7 - 30 mg/dL Final   11/04/2020 19 7 - 30 mg/dL Final     Creatinine   Date Value Ref Range Status   02/08/2024 1.02 0.67 - 1.17 mg/dL Final   11/04/2020 1.10 0.66 - 1.25 mg/dL Final     GFR Estimate   Date Value Ref Range Status   02/08/2024 83 >60 mL/min/1.73m2 Final   11/04/2020 73 >60 mL/min/[1.73_m2] Final     Comment:     Non  GFR Calc  Starting  12/18/2018, serum creatinine based estimated GFR (eGFR) will be   calculated using the Chronic Kidney Disease Epidemiology Collaboration   (CKD-EPI) equation.       Calcium   Date Value Ref Range Status   02/08/2024 8.8 8.8 - 10.2 mg/dL Final   11/04/2020 8.6 8.5 - 10.1 mg/dL Final     Bilirubin Total   Date Value Ref Range Status   02/08/2024 0.4 <=1.2 mg/dL Final   07/31/2019 0.6 0.2 - 1.3 mg/dL Final     Alkaline Phosphatase   Date Value Ref Range Status   02/08/2024 62 40 - 150 U/L Final     Comment:     Reference intervals for this test were updated on 11/14/2023 to more accurately reflect our healthy population. There may be differences in the flagging of prior results with similar values performed with this method. Interpretation of those prior results can be made in the context of the updated reference intervals.   07/31/2019 73 40 - 150 U/L Final     ALT   Date Value Ref Range Status   02/08/2024 26 0 - 70 U/L Final     Comment:     Reference intervals for this test were updated on 6/12/2023 to more accurately reflect our healthy population. There may be differences in the flagging of prior results with similar values performed with this method. Interpretation of those prior results can be made in the context of the updated reference intervals.     07/31/2019 37 0 - 70 U/L Final     AST   Date Value Ref Range Status   02/08/2024 23 0 - 45 U/L Final     Comment:     Reference intervals for this test were updated on 6/12/2023 to more accurately reflect our healthy population. There may be differences in the flagging of prior results with similar values performed with this method. Interpretation of those prior results can be made in the context of the updated reference intervals.   07/31/2019 25 0 - 45 U/L Final        EKG: appears normal, NSR, questionable left axis deviation.    Revised Cardiac Risk Index (RCRI)  The patient has the following serious cardiovascular risks for perioperative complications:   -  No serious cardiac risks = 0 points     RCRI Interpretation: 0 points: Class I (very low risk - 0.4% complication rate)         Signed Electronically by: Nathen Leon DO  Copy of this evaluation report is provided to requesting physician.

## 2024-10-01 PROBLEM — Z53.1 PROCEDURE AND TREATMENT NOT CARRIED OUT BECAUSE OF PATIENT'S DECISION FOR REASONS OF BELIEF AND GROUP PRESSURE: Status: ACTIVE | Noted: 2022-11-10

## 2024-11-25 ENCOUNTER — TELEPHONE (OUTPATIENT)
Dept: INTERNAL MEDICINE | Facility: OTHER | Age: 64
End: 2024-11-25

## 2024-11-25 NOTE — TELEPHONE ENCOUNTER
Call returned, update provided the Yellow Fever Vaccine is not available through [facility].     Patient may reach out to local pharmacies to schedule an appointment.

## 2024-11-25 NOTE — TELEPHONE ENCOUNTER
10:37 AM    Reason for Call: Phone Call    Description: Patient called in asking if our clinic administers Yellow Fever vaccinations as [place]. Please call patient back.    Was an appointment offered for this call? No  If yes : Appointment type              Date    Preferred method for responding to this message: Telephone Call  What is your phone number ? [phone]     If we cannot reach you directly, may we leave a detailed response at the number you provided? Yes    Can this message wait until your PCP/provider returns, if available today? Hali Haines

## 2025-07-03 ENCOUNTER — TRANSFERRED RECORDS (OUTPATIENT)
Dept: HEALTH INFORMATION MANAGEMENT | Facility: CLINIC | Age: 65
End: 2025-07-03

## 2025-07-31 ENCOUNTER — OFFICE VISIT (OUTPATIENT)
Dept: FAMILY MEDICINE | Facility: OTHER | Age: 65
End: 2025-07-31
Attending: FAMILY MEDICINE
Payer: COMMERCIAL

## 2025-07-31 VITALS
BODY MASS INDEX: 38.45 KG/M2 | SYSTOLIC BLOOD PRESSURE: 132 MMHG | WEIGHT: 224 LBS | HEART RATE: 88 BPM | RESPIRATION RATE: 16 BRPM | TEMPERATURE: 98 F | DIASTOLIC BLOOD PRESSURE: 70 MMHG | OXYGEN SATURATION: 98 %

## 2025-07-31 DIAGNOSIS — Z76.89 ENCOUNTER TO ESTABLISH CARE: Primary | ICD-10-CM

## 2025-07-31 DIAGNOSIS — Z71.85 VACCINE COUNSELING: ICD-10-CM

## 2025-07-31 DIAGNOSIS — E66.01 MORBID OBESITY (H): ICD-10-CM

## 2025-07-31 DIAGNOSIS — I10 ESSENTIAL HYPERTENSION: ICD-10-CM

## 2025-07-31 DIAGNOSIS — R29.818 SUSPECTED SLEEP APNEA: ICD-10-CM

## 2025-07-31 DIAGNOSIS — Z91.89 FRAMINGHAM CARDIAC RISK 10-20% IN NEXT 10 YEARS: ICD-10-CM

## 2025-07-31 DIAGNOSIS — Z53.20 STATIN MEDICATION DECLINED BY PATIENT: ICD-10-CM

## 2025-07-31 DIAGNOSIS — Z12.5 SCREENING PSA (PROSTATE SPECIFIC ANTIGEN): ICD-10-CM

## 2025-07-31 LAB
ALBUMIN SERPL BCG-MCNC: 4.2 G/DL (ref 3.5–5.2)
ALP SERPL-CCNC: 60 U/L (ref 40–150)
ALT SERPL W P-5'-P-CCNC: 37 U/L (ref 0–70)
ANION GAP SERPL CALCULATED.3IONS-SCNC: 12 MMOL/L (ref 7–15)
AST SERPL W P-5'-P-CCNC: 28 U/L (ref 0–45)
BASOPHILS # BLD AUTO: 0.1 10E3/UL (ref 0–0.2)
BASOPHILS NFR BLD AUTO: 1 %
BILIRUB SERPL-MCNC: 0.6 MG/DL
BUN SERPL-MCNC: 21.6 MG/DL (ref 8–23)
CALCIUM SERPL-MCNC: 9.3 MG/DL (ref 8.8–10.4)
CHLORIDE SERPL-SCNC: 105 MMOL/L (ref 98–107)
CREAT SERPL-MCNC: 1 MG/DL (ref 0.67–1.17)
CREAT UR-MCNC: 219.3 MG/DL
EGFRCR SERPLBLD CKD-EPI 2021: 84 ML/MIN/1.73M2
EOSINOPHIL # BLD AUTO: 0.5 10E3/UL (ref 0–0.7)
EOSINOPHIL NFR BLD AUTO: 7 %
ERYTHROCYTE [DISTWIDTH] IN BLOOD BY AUTOMATED COUNT: 12.8 % (ref 10–15)
GLUCOSE SERPL-MCNC: 102 MG/DL (ref 70–99)
HCO3 SERPL-SCNC: 25 MMOL/L (ref 22–29)
HCT VFR BLD AUTO: 49.7 % (ref 40–53)
HGB BLD-MCNC: 16.9 G/DL (ref 13.3–17.7)
IMM GRANULOCYTES # BLD: 0 10E3/UL
IMM GRANULOCYTES NFR BLD: 1 %
LYMPHOCYTES # BLD AUTO: 2.1 10E3/UL (ref 0.8–5.3)
LYMPHOCYTES NFR BLD AUTO: 28 %
MAGNESIUM SERPL-MCNC: 2 MG/DL (ref 1.7–2.3)
MCH RBC QN AUTO: 29.4 PG (ref 26.5–33)
MCHC RBC AUTO-ENTMCNC: 34 G/DL (ref 31.5–36.5)
MCV RBC AUTO: 87 FL (ref 78–100)
MICROALBUMIN UR-MCNC: <12 MG/L
MICROALBUMIN/CREAT UR: NORMAL MG/G{CREAT}
MONOCYTES # BLD AUTO: 0.6 10E3/UL (ref 0–1.3)
MONOCYTES NFR BLD AUTO: 8 %
NEUTROPHILS # BLD AUTO: 4 10E3/UL (ref 1.6–8.3)
NEUTROPHILS NFR BLD AUTO: 55 %
NRBC # BLD AUTO: 0 10E3/UL
NRBC BLD AUTO-RTO: 0 /100
PLATELET # BLD AUTO: 188 10E3/UL (ref 150–450)
POTASSIUM SERPL-SCNC: 4 MMOL/L (ref 3.4–5.3)
PROT SERPL-MCNC: 7 G/DL (ref 6.4–8.3)
PSA SERPL DL<=0.01 NG/ML-MCNC: 2.4 NG/ML (ref 0–4.5)
RBC # BLD AUTO: 5.74 10E6/UL (ref 4.4–5.9)
SODIUM SERPL-SCNC: 142 MMOL/L (ref 135–145)
VIT D+METAB SERPL-MCNC: 22 NG/ML (ref 20–50)
WBC # BLD AUTO: 7.3 10E3/UL (ref 4–11)

## 2025-07-31 PROCEDURE — 82306 VITAMIN D 25 HYDROXY: CPT | Mod: ZL | Performed by: FAMILY MEDICINE

## 2025-07-31 PROCEDURE — G0463 HOSPITAL OUTPT CLINIC VISIT: HCPCS

## 2025-07-31 PROCEDURE — 85014 HEMATOCRIT: CPT | Mod: ZL | Performed by: FAMILY MEDICINE

## 2025-07-31 PROCEDURE — 36415 COLL VENOUS BLD VENIPUNCTURE: CPT | Mod: ZL | Performed by: FAMILY MEDICINE

## 2025-07-31 PROCEDURE — 82570 ASSAY OF URINE CREATININE: CPT | Mod: ZL | Performed by: FAMILY MEDICINE

## 2025-07-31 PROCEDURE — 82247 BILIRUBIN TOTAL: CPT | Mod: ZL | Performed by: FAMILY MEDICINE

## 2025-07-31 PROCEDURE — 83735 ASSAY OF MAGNESIUM: CPT | Mod: ZL | Performed by: FAMILY MEDICINE

## 2025-07-31 PROCEDURE — G0103 PSA SCREENING: HCPCS | Mod: ZL | Performed by: FAMILY MEDICINE

## 2025-07-31 RX ORDER — LISINOPRIL 20 MG/1
20 TABLET ORAL DAILY
Qty: 90 TABLET | Refills: 3 | Status: SHIPPED | OUTPATIENT
Start: 2025-07-31

## 2025-07-31 RX ORDER — AZITHROMYCIN 500 MG/1
TABLET, FILM COATED ORAL
COMMUNITY
Start: 2025-03-11 | End: 2025-07-31

## 2025-07-31 ASSESSMENT — PAIN SCALES - GENERAL: PAINLEVEL_OUTOF10: NO PAIN (0)

## 2025-07-31 NOTE — PROGRESS NOTES
Assessment & Plan     Encounter to establish care    Essential hypertension  He is missing a few doses of his lisinopril each week, encouraged him to take regularly.  - lisinopril (ZESTRIL) 20 MG tablet; Take 1 tablet (20 mg) by mouth daily.  - CBC with Platelets & Differential  - Comprehensive metabolic panel  - Albumin Random Urine Quantitative with Creat Ratio  - Magnesium    [place] cardiac risk 10-20% in next 10 years  Declines medication at this time    Suspected sleep apnea  Declines testing for sleep apnea at this time    Morbid obesity (H)  Weight loss encouraged and discussed as below  - Vitamin D Deficiency    Screening PSA (prostate specific antigen)  - Prostate Specific Antigen Screen    Statin medication declined by patient    Vaccine counseling  Discussed age-appropriate vaccines, he will get from his pharmacy if desired      He is planning to come back for a physical in about 6 months, we will get updated cholesterol at that time as he ate a massive breakfast rate for coming in today.  Will check his weight loss progress at that time.      Subjective   Xander is a 65 year old, presenting for the following health issues:  Establish Care        7/31/2025     9:14 AM   Additional Questions   Roomed by Luis Fernando lpn   Accompanied by spouse     History of Present Illness       Reason for visit:  Meet the doctor He is missing 3 dose(s) of medications per week.        Jluis is a 65-year-old male being seen today to establish care, accompanied by his wife who frequently contributes her opinion to our discussion trying to sway him based on her beliefs.  He is on lisinopril 20 mg daily for hypertension, he misses a few doses per week.  His blood pressure today is 132/70.  He is not having any medication side effects.    His BMI is 38.45, weight 224 pounds.  He has never been tested for sleep apnea.  Wife states he does snore extensively and stops breathing in his sleep.  We discussed getting him tested for  sleep apnea but wife interrupts stating he won't wear a CPAP because of her past intolerances.  We did discuss the long-term health consequences of sleep apnea.    Reviewing his labs from February 2024, his LDL cholesterol was 121.  His 10-year ASCVD risk score is 14.6% using his previous numbers.  We discussed recommendation for starting cholesterol medications if over 10%.  We are having a nice discussion on the pros and cons and rationale for this but his wife expresses her dislike for statins based on her past adverse reactions.  Currently he wishes to work on lifestyle modifications.    Discussed age-appropriate vaccines and provide him with a list for vaccines he can consider.  Mainly Tdap, Shingrix, and Prevnar 20.  He could also can consider his second hepatitis A shot and the hepatitis B series.  Discussed that while typically not recommended routinely for patients over age 60, they are given as part of routine childhood vaccines nowadays and given his obesity he likely is developing fatty liver disease which would be indication for doing these vaccines at his current age.    He is due for a PSA.  Wife reports his urine stream is decreasing at night as she can no longer hear him urinating when he goes.  He himself does not have any complaints at this time.    They are asking about weight loss and I discussed calorie restriction in addition to an exercise program.    He is due for a PSA.  He had a negative Cologuard done earlier this month.    The 10-year ASCVD risk score (Ligia VALVERDE, et al., 2019) is: 14.6%    Values used to calculate the score:      Age: 65 years      Sex: Male      Is Non- : No      Diabetic: No      Tobacco smoker: No      Systolic Blood Pressure: 132 mmHg      Is BP treated: Yes      HDL Cholesterol: 43 mg/dL      Total Cholesterol: 180 mg/dL     Hypertension Follow-up    Do you check your blood pressure regularly outside of the clinic? No   Are you following a  low salt diet? Yes  Are your blood pressures ever more than 140 on the top number (systolic) OR more   than 90 on the bottom number (diastolic), for example 140/90? No    BP Readings from Last 2 Encounters:   07/31/25 132/70   04/10/24 136/72                 Objective    /70 (BP Location: Left arm, Patient Position: Sitting, Cuff Size: Adult Large)   Pulse 88   Temp 98  F (36.7  C) (Tympanic)   Resp 16   Wt 101.6 kg (224 lb)   SpO2 98%   BMI 38.45 kg/m    Body mass index is 38.45 kg/m .  Physical Exam  Constitutional:       General: He is not in acute distress.     Appearance: Normal appearance.   HENT:      Head: Normocephalic and atraumatic.   Eyes:      Conjunctiva/sclera: Conjunctivae normal.   Neck:      Vascular: No carotid bruit.   Cardiovascular:      Rate and Rhythm: Normal rate and regular rhythm.      Heart sounds: Normal heart sounds. No murmur heard.  Pulmonary:      Effort: Pulmonary effort is normal.      Breath sounds: Normal breath sounds.   Abdominal:      General: Bowel sounds are normal. There is no distension.      Palpations: Abdomen is soft.      Tenderness: There is no abdominal tenderness.   Musculoskeletal:      Right lower leg: No edema.      Left lower leg: No edema.   Lymphadenopathy:      Cervical: No cervical adenopathy.   Neurological:      Mental Status: He is alert and oriented to person, place, and time.            Time spent on day of service including chart review, face-to-face history and exam, counseling/discussion as per note above, and documentation time: 43 minutes.        Signed Electronically by: Sergo Thompson DO